# Patient Record
Sex: FEMALE | Race: WHITE | NOT HISPANIC OR LATINO | Employment: FULL TIME | URBAN - METROPOLITAN AREA
[De-identification: names, ages, dates, MRNs, and addresses within clinical notes are randomized per-mention and may not be internally consistent; named-entity substitution may affect disease eponyms.]

---

## 2017-07-21 ENCOUNTER — OFFICE VISIT (OUTPATIENT)
Dept: FAMILY MEDICINE CLINIC | Facility: CLINIC | Age: 52
End: 2017-07-21

## 2017-07-21 VITALS
SYSTOLIC BLOOD PRESSURE: 150 MMHG | TEMPERATURE: 97.9 F | HEIGHT: 63 IN | RESPIRATION RATE: 16 BRPM | OXYGEN SATURATION: 98 % | HEART RATE: 102 BPM | DIASTOLIC BLOOD PRESSURE: 100 MMHG

## 2017-07-21 DIAGNOSIS — Z13.220 LIPID SCREENING: ICD-10-CM

## 2017-07-21 DIAGNOSIS — IMO0001 ELEVATED BLOOD PRESSURE: Primary | ICD-10-CM

## 2017-07-21 PROCEDURE — 99213 OFFICE O/P EST LOW 20 MIN: CPT | Performed by: NURSE PRACTITIONER

## 2017-07-21 RX ORDER — CHLORHEXIDINE GLUCONATE 0.12 MG/ML
RINSE ORAL
Refills: 2 | COMMUNITY
Start: 2017-07-14 | End: 2017-10-24

## 2017-07-21 NOTE — PROGRESS NOTES
Subjective   Keke Stewart is a 52 y.o. female.   Chief Complaint   Patient presents with   • Blood Pressure Check     pt states would like to come in to do b/p check      Vitals:    07/21/17 1030   BP: 150/100   Pulse: 102   Resp: 16   Temp: 97.9 °F (36.6 °C)   SpO2: 98%     No LMP recorded. Patient is postmenopausal.    History of Present Illness  Keke is a patient of Dr Martin who is here for a blood pressure check. She went to her gyn yesterday and her blood pressure was elevated and was told to schedule an appt for today for a recheck. She does not check it regularly at home. She reports she has been stressed about certain things which caused some weight gain. She also reports taht she gets nervous when she goes to the doctors office. She denies dizziness, headaches, visual disturbance or light headedness. Over the past few weeks she has been eating better and exercising     The following portions of the patient's history were reviewed and updated as appropriate: allergies, current medications, past family history, past medical history, past social history, past surgical history and problem list.    Review of Systems   Constitutional: Negative for chills, fatigue and fever.   HENT: Negative.    Eyes: Negative for visual disturbance.   Respiratory: Negative for cough, chest tightness, shortness of breath and wheezing.    Cardiovascular: Negative for chest pain, palpitations and leg swelling.   Genitourinary: Negative.    Musculoskeletal: Negative for arthralgias.   Neurological: Negative for dizziness, numbness and headaches.       Objective   Physical Exam   Constitutional: Vital signs are normal. She appears well-developed and well-nourished. No distress.   Cardiovascular: Normal rate, regular rhythm, S1 normal and normal heart sounds.    Pulmonary/Chest: Effort normal and breath sounds normal.   Neurological: She is alert.   Skin: Skin is warm and dry.       Assessment/Plan   Keke was seen today  for blood pressure check.    Diagnoses and all orders for this visit:    Elevated blood pressure  -     Lipid Panel With LDL / HDL Ratio  -     Comprehensive Metabolic Panel  -     TSH Rfx On Abnormal To Free T4  -     Urinalysis With Microscopic    Lipid screening  -     Lipid Panel With LDL / HDL Ratio      She is fasting so will check labs today  Advised to monitor her blood pressure at home twice daily and record   Given an education sheet on at home blood pressure monitoring  Follow up in 2 weeks for a recheck and to discuss labs or sooner if needed

## 2017-07-22 LAB
ALBUMIN SERPL-MCNC: 4.8 G/DL (ref 3.5–5.5)
ALBUMIN/GLOB SERPL: 1.8 {RATIO} (ref 1.2–2.2)
ALP SERPL-CCNC: 102 IU/L (ref 39–117)
ALT SERPL-CCNC: 22 IU/L (ref 0–32)
APPEARANCE UR: CLEAR
AST SERPL-CCNC: 23 IU/L (ref 0–40)
BACTERIA #/AREA URNS HPF: NORMAL /[HPF]
BILIRUB SERPL-MCNC: 0.4 MG/DL (ref 0–1.2)
BILIRUB UR QL STRIP: NEGATIVE
BUN SERPL-MCNC: 10 MG/DL (ref 6–24)
BUN/CREAT SERPL: 12 (ref 9–23)
CALCIUM SERPL-MCNC: 9.8 MG/DL (ref 8.7–10.2)
CHLORIDE SERPL-SCNC: 100 MMOL/L (ref 96–106)
CHOLEST SERPL-MCNC: 177 MG/DL (ref 100–199)
CO2 SERPL-SCNC: 25 MMOL/L (ref 18–29)
COLOR UR: YELLOW
CREAT SERPL-MCNC: 0.85 MG/DL (ref 0.57–1)
EPI CELLS #/AREA URNS HPF: NORMAL /HPF
GLOBULIN SER CALC-MCNC: 2.6 G/DL (ref 1.5–4.5)
GLUCOSE SERPL-MCNC: 103 MG/DL (ref 65–99)
GLUCOSE UR QL: NEGATIVE
HDLC SERPL-MCNC: 69 MG/DL
HGB UR QL STRIP: NEGATIVE
KETONES UR QL STRIP: NEGATIVE
LDLC SERPL CALC-MCNC: 91 MG/DL (ref 0–99)
LDLC/HDLC SERPL: 1.3 RATIO UNITS (ref 0–3.2)
LEUKOCYTE ESTERASE UR QL STRIP: (no result)
MICRO URNS: (no result)
MUCOUS THREADS URNS QL MICRO: PRESENT
NITRITE UR QL STRIP: NEGATIVE
PH UR STRIP: 6.5 [PH] (ref 5–7.5)
POTASSIUM SERPL-SCNC: 4 MMOL/L (ref 3.5–5.2)
PROT SERPL-MCNC: 7.4 G/DL (ref 6–8.5)
PROT UR QL STRIP: NEGATIVE
RBC #/AREA URNS HPF: NORMAL /HPF
SODIUM SERPL-SCNC: 143 MMOL/L (ref 134–144)
SP GR UR: 1.01 (ref 1–1.03)
TRIGL SERPL-MCNC: 86 MG/DL (ref 0–149)
TSH SERPL DL<=0.005 MIU/L-ACNC: 1.28 UIU/ML (ref 0.45–4.5)
UROBILINOGEN UR STRIP-MCNC: 0.2 MG/DL (ref 0.2–1)
VLDLC SERPL CALC-MCNC: 17 MG/DL (ref 5–40)
WBC #/AREA URNS HPF: NORMAL /HPF

## 2017-07-24 ENCOUNTER — TELEPHONE (OUTPATIENT)
Dept: FAMILY MEDICINE CLINIC | Facility: CLINIC | Age: 52
End: 2017-07-24

## 2017-07-24 NOTE — TELEPHONE ENCOUNTER
TALKED TO PT AND LET PT KNOW PER RIVERA MELISSA. PT UNDERSTOOD AN WILL GET LAB COPY @ NEXT APPT.   ----- Message from MARTHA Nevarez sent at 7/24/2017  8:25 AM EDT -----  Please call her and let her know that her labs were normal   Will give her a copy at her follow up

## 2017-08-01 LAB
SPECIMEN STATUS: NORMAL
WRITTEN AUTHORIZATION: NORMAL

## 2017-08-02 ENCOUNTER — OFFICE VISIT (OUTPATIENT)
Dept: FAMILY MEDICINE CLINIC | Facility: CLINIC | Age: 52
End: 2017-08-02

## 2017-08-02 VITALS
HEART RATE: 94 BPM | DIASTOLIC BLOOD PRESSURE: 88 MMHG | OXYGEN SATURATION: 99 % | WEIGHT: 184.8 LBS | HEIGHT: 63 IN | RESPIRATION RATE: 16 BRPM | TEMPERATURE: 97.8 F | SYSTOLIC BLOOD PRESSURE: 130 MMHG | BODY MASS INDEX: 32.74 KG/M2

## 2017-08-02 DIAGNOSIS — IMO0001 ELEVATED BLOOD PRESSURE: Primary | ICD-10-CM

## 2017-08-02 PROCEDURE — 99213 OFFICE O/P EST LOW 20 MIN: CPT | Performed by: NURSE PRACTITIONER

## 2017-08-02 NOTE — PROGRESS NOTES
Subjective   Keke Stewart is a 52 y.o. female. Patient is here today for   Chief Complaint   Patient presents with   • Blood Pressure Check   • lab follow up          Vitals:    08/02/17 1057   BP: 130/88   Pulse: 94   Resp: 16   Temp: 97.8 °F (36.6 °C)   SpO2: 99%       The following portions of the patient's history were reviewed and updated as appropriate: allergies, current medications, past family history, past medical history, past social history, past surgical history and problem list.    Past Medical History:   Diagnosis Date   • Headache       Allergies   Allergen Reactions   • Codeine    • Erythromycin    • Penicillins       Social History     Social History   • Marital status:      Spouse name: N/A   • Number of children: N/A   • Years of education: N/A     Occupational History   • Not on file.     Social History Main Topics   • Smoking status: Never Smoker   • Smokeless tobacco: Never Used   • Alcohol use No   • Drug use: Not on file   • Sexual activity: Not on file     Other Topics Concern   • Not on file     Social History Narrative        Current Outpatient Prescriptions:   •  chlorhexidine (PERIDEX) 0.12 % solution, U UTD, Disp: , Rfl: 2  •  montelukast (SINGULAIR) 10 MG tablet, TK 1 T PO HS, Disp: , Rfl: 3     Objective   History of Present Illness  Keke is here for a two week follow up for elevated blood pressure. She has been monitoring her blood pressure at home twice daily. I reviewed her blood pressure log and it has been ranging from 120-140/80-90. She has been eating well and walking daily. She has lost 6 lbs from her last visit. She feels well and has no complaints today. I discussed her labs in detail with her.     Review of Systems   Constitutional: Negative for chills, fatigue and fever.   Respiratory: Negative.    Cardiovascular: Negative.    Genitourinary: Negative.    Musculoskeletal: Negative.    Neurological: Negative for dizziness and headaches.       Physical Exam    Constitutional: Vital signs are normal. She appears well-developed and well-nourished. No distress.   Cardiovascular: Normal rate, regular rhythm and normal heart sounds.    Pulmonary/Chest: Effort normal and breath sounds normal.   Neurological: She is alert.   Skin: Skin is warm and dry.   Psychiatric: She has a normal mood and affect.     Office Visit on 07/21/2017   Component Date Value Ref Range Status   • Total Cholesterol 07/21/2017 177  100 - 199 mg/dL Final   • Triglycerides 07/21/2017 86  0 - 149 mg/dL Final   • HDL Cholesterol 07/21/2017 69  >39 mg/dL Final   • VLDL Cholesterol 07/21/2017 17  5 - 40 mg/dL Final   • LDL Cholesterol  07/21/2017 91  0 - 99 mg/dL Final   • LDL/HDL Ratio 07/21/2017 1.3  0.0 - 3.2 ratio units Final    Comment:                                     LDL/HDL Ratio                                              Men  Women                                1/2 Avg.Risk  1.0    1.5                                    Avg.Risk  3.6    3.2                                 2X Avg.Risk  6.2    5.0                                 3X Avg.Risk  8.0    6.1     • Glucose 07/21/2017 103* 65 - 99 mg/dL Final   • BUN 07/21/2017 10  6 - 24 mg/dL Final   • Creatinine 07/21/2017 0.85  0.57 - 1.00 mg/dL Final   • eGFR Non African Am 07/21/2017 78  >59 mL/min/1.73 Final   • eGFR African Am 07/21/2017 90  >59 mL/min/1.73 Final   • BUN/Creatinine Ratio 07/21/2017 12  9 - 23 Final   • Sodium 07/21/2017 143  134 - 144 mmol/L Final   • Potassium 07/21/2017 4.0  3.5 - 5.2 mmol/L Final   • Chloride 07/21/2017 100  96 - 106 mmol/L Final   • Total CO2 07/21/2017 25  18 - 29 mmol/L Final   • Calcium 07/21/2017 9.8  8.7 - 10.2 mg/dL Final   • Total Protein 07/21/2017 7.4  6.0 - 8.5 g/dL Final   • Albumin 07/21/2017 4.8  3.5 - 5.5 g/dL Final   • Globulin 07/21/2017 2.6  1.5 - 4.5 g/dL Final   • A/G Ratio 07/21/2017 1.8  1.2 - 2.2 Final   • Total Bilirubin 07/21/2017 0.4  0.0 - 1.2 mg/dL Final   • Alkaline Phosphatase  07/21/2017 102  39 - 117 IU/L Final   • AST (SGOT) 07/21/2017 23  0 - 40 IU/L Final   • ALT (SGPT) 07/21/2017 22  0 - 32 IU/L Final   • TSH 07/21/2017 1.280  0.450 - 4.500 uIU/mL Final   • Specific Gravity, UA 07/21/2017 1.010  1.005 - 1.030 Final   • pH, UA 07/21/2017 6.5  5.0 - 7.5 Final   • Color, UA 07/21/2017 Yellow  Yellow Final   • Appearance, UA 07/21/2017 Clear  Clear Final   • Leukocytes, UA 07/21/2017 Trace* Negative Final   • Protein 07/21/2017 Negative  Negative/Trace Final   • Glucose, UA 07/21/2017 Negative  Negative Final   • Ketones 07/21/2017 Negative  Negative Final   • Blood, UA 07/21/2017 Negative  Negative Final   • Bilirubin, UA 07/21/2017 Negative  Negative Final   • Urobilinogen, UA 07/21/2017 0.2  0.2 - 1.0 mg/dL Final   • Nitrite, UA 07/21/2017 Negative  Negative Final   • Microscopic Examination 07/21/2017 See below:   Final    Microscopic was indicated and was performed.   • WBC, UA 07/21/2017 0-5  0 - 5 /hpf Final   • RBC, UA 07/21/2017 None seen  0 - 2 /hpf Final   • Epithelial Cells (non renal) 07/21/2017 None seen  0 - 10 /hpf Final   • Mucus, UA 07/21/2017 Present  Not Estab. Final   • Bacteria, UA 07/21/2017 None seen  None seen/Few Final   • Specimen Status 07/21/2017 Comment   Final    Comment: We have received your request for additional testing, however we are  unable to add the test you requested.  The following test(s) were not  performed:  Test not performed and specimen no longer remains for testing.  Test:  [id number] Hepatitis C Antibodies, with reflex to NEVAEH     • Written Authorization 07/21/2017 Comment   Final    Comment: Written Authorization Received.  Authorization received from Riya Marinelli  per manual add form 08-  Logged by Heather Boone         ASSESSMENT  Problem List Items Addressed This Visit     None      Visit Diagnoses     Elevated blood pressure    -  Primary          PLAN    Continue with diet, exercise and weight loss  Blood pressure is  stable  Call if blood pressure >140/90  Follow up yearly for physical

## 2017-08-04 ENCOUNTER — APPOINTMENT (OUTPATIENT)
Dept: WOMENS IMAGING | Facility: HOSPITAL | Age: 52
End: 2017-08-04

## 2017-08-04 PROCEDURE — 77063 BREAST TOMOSYNTHESIS BI: CPT | Performed by: RADIOLOGY

## 2017-08-04 PROCEDURE — 77067 SCR MAMMO BI INCL CAD: CPT | Performed by: RADIOLOGY

## 2017-08-04 PROCEDURE — 77065 DX MAMMO INCL CAD UNI: CPT | Performed by: RADIOLOGY

## 2017-08-04 PROCEDURE — MDREVSPNEW: Performed by: RADIOLOGY

## 2017-08-04 PROCEDURE — G0206 DX MAMMO INCL CAD UNI: HCPCS | Performed by: RADIOLOGY

## 2017-08-04 PROCEDURE — 76641 ULTRASOUND BREAST COMPLETE: CPT | Performed by: RADIOLOGY

## 2017-08-04 PROCEDURE — G0202 SCR MAMMO BI INCL CAD: HCPCS | Performed by: RADIOLOGY

## 2017-08-04 PROCEDURE — 77061 BREAST TOMOSYNTHESIS UNI: CPT | Performed by: RADIOLOGY

## 2017-08-04 PROCEDURE — G0279 TOMOSYNTHESIS, MAMMO: HCPCS | Performed by: RADIOLOGY

## 2017-10-24 ENCOUNTER — OFFICE VISIT (OUTPATIENT)
Dept: FAMILY MEDICINE CLINIC | Facility: CLINIC | Age: 52
End: 2017-10-24

## 2017-10-24 VITALS
HEIGHT: 63 IN | TEMPERATURE: 98.2 F | BODY MASS INDEX: 33.35 KG/M2 | HEART RATE: 93 BPM | DIASTOLIC BLOOD PRESSURE: 84 MMHG | RESPIRATION RATE: 16 BRPM | SYSTOLIC BLOOD PRESSURE: 124 MMHG | WEIGHT: 188.2 LBS | OXYGEN SATURATION: 99 %

## 2017-10-24 DIAGNOSIS — G47.00 INSOMNIA, UNSPECIFIED TYPE: Primary | ICD-10-CM

## 2017-10-24 DIAGNOSIS — J30.9 CHRONIC ALLERGIC RHINITIS, UNSPECIFIED SEASONALITY, UNSPECIFIED TRIGGER: ICD-10-CM

## 2017-10-24 PROCEDURE — 99213 OFFICE O/P EST LOW 20 MIN: CPT | Performed by: NURSE PRACTITIONER

## 2017-10-24 RX ORDER — TRAZODONE HYDROCHLORIDE 50 MG/1
50 TABLET ORAL NIGHTLY
Qty: 30 TABLET | Refills: 3 | Status: SHIPPED | OUTPATIENT
Start: 2017-10-24 | End: 2018-12-11

## 2017-10-24 RX ORDER — MONTELUKAST SODIUM 10 MG/1
10 TABLET ORAL NIGHTLY
Qty: 90 TABLET | Refills: 3 | Status: SHIPPED | OUTPATIENT
Start: 2017-10-24 | End: 2019-01-20 | Stop reason: SDUPTHER

## 2017-10-24 NOTE — PROGRESS NOTES
Subjective   Keke Stewart is a 52 y.o. female.   Chief Complaint   Patient presents with   • Insomnia     pt states cannot get sleep and had previously , pt states takes otc meds and doesn't work     Vitals:    10/24/17 1452   BP: 124/84   Pulse: 93   Resp: 16   Temp: 98.2 °F (36.8 °C)   SpO2: 99%     No LMP recorded. Patient is postmenopausal.    History of Present Illness  Keke is here for to discuss insomnia. She has had in on and off for awhile. She has tried advil PM and melatonin with little relief. She has trouble falling asleep and staying asleep. It often takes her over an hour to fall asleep and if she wakes up in the middle of the night it takes her 2 hours to fall asleep. She reports that her insomnia has worsened since her daughter started college . She denies any symptoms of CASSY such as witnessed apnea, snoring, or daytime hypersomnia   She also needs a refill for her singulair     The following portions of the patient's history were reviewed and updated as appropriate: allergies, current medications, past family history, past medical history, past social history, past surgical history and problem list.    Review of Systems   Constitutional: Negative for chills, fatigue and fever.   Respiratory: Negative for cough, chest tightness and wheezing.    Cardiovascular: Negative.    Allergic/Immunologic: Positive for environmental allergies.   Psychiatric/Behavioral: Positive for sleep disturbance.       Objective   Physical Exam   Constitutional: Vital signs are normal. She appears well-developed and well-nourished. No distress.   Cardiovascular: Normal rate.    Pulmonary/Chest: Effort normal and breath sounds normal.   Neurological: She is alert.   Skin: Skin is warm and dry.       Assessment/Plan   Keke was seen today for insomnia.    Diagnoses and all orders for this visit:    Insomnia, unspecified type    Chronic allergic rhinitis, unspecified seasonality, unspecified trigger    Other  orders  -     traZODone (DESYREL) 50 MG tablet; Take 1 tablet by mouth Every Night.  -     montelukast (SINGULAIR) 10 MG tablet; Take 1 tablet by mouth Every Night. TK 1 T PO HS      Start trazodone 50 1/2 tablet then increase to one tab if needed   Suggest sleep hygiene  Follow up if symptoms persist, worsen, or if new symptoms develop

## 2018-05-02 ENCOUNTER — OFFICE VISIT (OUTPATIENT)
Dept: FAMILY MEDICINE CLINIC | Facility: CLINIC | Age: 53
End: 2018-05-02

## 2018-05-02 VITALS
TEMPERATURE: 97.7 F | BODY MASS INDEX: 32.6 KG/M2 | RESPIRATION RATE: 17 BRPM | SYSTOLIC BLOOD PRESSURE: 140 MMHG | OXYGEN SATURATION: 99 % | DIASTOLIC BLOOD PRESSURE: 90 MMHG | WEIGHT: 184 LBS | HEIGHT: 63 IN | HEART RATE: 91 BPM

## 2018-05-02 DIAGNOSIS — J01.00 ACUTE MAXILLARY SINUSITIS, RECURRENCE NOT SPECIFIED: Primary | ICD-10-CM

## 2018-05-02 PROCEDURE — 99213 OFFICE O/P EST LOW 20 MIN: CPT | Performed by: NURSE PRACTITIONER

## 2018-05-02 RX ORDER — CEFUROXIME AXETIL 250 MG/1
250 TABLET ORAL 2 TIMES DAILY
Qty: 14 TABLET | Refills: 0 | Status: SHIPPED | OUTPATIENT
Start: 2018-05-02 | End: 2018-06-22

## 2018-05-02 NOTE — PROGRESS NOTES
Subjective   Keke Stewart is a 53 y.o. female.   Chief Complaint   Patient presents with   • Sinus Problem     x 9 days   • Dizziness     x 9 days     Vitals:    05/02/18 1433   BP: 140/90   Pulse: 91   Resp: 17   Temp: 97.7 °F (36.5 °C)   SpO2: 99%     No LMP recorded. Patient is postmenopausal.    History of Present Illness Sinus Pain  Patient complains of congestion, facial pain, nasal congestion and post nasal drip. Onset of symptoms was 1 week ago. Symptoms have been unchanged since that time. She is drinking plenty of fluids.  Past history is significant for allergic rhinitis and has been taking singulair . Patient is non-smoker.     The following portions of the patient's history were reviewed and updated as appropriate: allergies, current medications, past family history, past medical history, past social history, past surgical history and problem list.    Review of Systems   Constitutional: Negative for chills, fatigue and fever.   HENT: Positive for congestion, sinus pain and sinus pressure.    Eyes: Negative for visual disturbance.   Respiratory: Negative for cough, shortness of breath and wheezing.    Neurological: Positive for dizziness (has improved, ).       Objective   Physical Exam   Constitutional: Vital signs are normal. She appears well-developed and well-nourished. She does not appear ill. No distress.   HENT:   Right Ear: Ear canal normal. A middle ear effusion is present.   Left Ear: Ear canal normal. A middle ear effusion is present.   Nose: Mucosal edema and rhinorrhea (purulent ) present. Right sinus exhibits maxillary sinus tenderness. Left sinus exhibits maxillary sinus tenderness.   Mouth/Throat: Uvula is midline. Posterior oropharyngeal erythema (post nasal drainage noted in the posterior pharynx ) present.   Cardiovascular: Normal rate and regular rhythm.    Pulmonary/Chest: Effort normal and breath sounds normal.   Neurological: She is alert.       Assessment/Plan   Keke segura  seen today for sinus problem and dizziness.    Diagnoses and all orders for this visit:    Acute maxillary sinusitis, recurrence not specified    Other orders  -     cefuroxime (CEFTIN) 250 MG tablet; Take 1 tablet by mouth 2 (Two) Times a Day.      Start flonase  Continue singulair   Start claritin  She has a PCN allergy but has tolerated cephalosporins, I gave her warnings of possible cross reaction with PCN allergy  Sinus rinses with saline  She has a plantars wart that has been bothering her on her foot, a list of podiatrists were given to her to call for further eval  Follow up as needed for persistent or worsening symptoms and for continual care

## 2018-06-22 ENCOUNTER — OFFICE VISIT (OUTPATIENT)
Dept: FAMILY MEDICINE CLINIC | Facility: CLINIC | Age: 53
End: 2018-06-22

## 2018-06-22 VITALS
RESPIRATION RATE: 18 BRPM | BODY MASS INDEX: 33.31 KG/M2 | SYSTOLIC BLOOD PRESSURE: 142 MMHG | HEART RATE: 84 BPM | WEIGHT: 188 LBS | DIASTOLIC BLOOD PRESSURE: 90 MMHG | TEMPERATURE: 98 F | HEIGHT: 63 IN

## 2018-06-22 DIAGNOSIS — R35.0 URINARY FREQUENCY: Primary | ICD-10-CM

## 2018-06-22 LAB
BILIRUB BLD-MCNC: NEGATIVE MG/DL
CLARITY, POC: CLEAR
COLOR UR: YELLOW
GLUCOSE UR STRIP-MCNC: NEGATIVE MG/DL
KETONES UR QL: NEGATIVE
LEUKOCYTE EST, POC: NEGATIVE
NITRITE UR-MCNC: NEGATIVE MG/ML
PH UR: 6 [PH] (ref 5–8)
PROT UR STRIP-MCNC: NEGATIVE MG/DL
RBC # UR STRIP: NEGATIVE /UL
SP GR UR: 1.02 (ref 1–1.03)
UROBILINOGEN UR QL: NORMAL

## 2018-06-22 PROCEDURE — 81003 URINALYSIS AUTO W/O SCOPE: CPT | Performed by: INTERNAL MEDICINE

## 2018-06-22 PROCEDURE — 99213 OFFICE O/P EST LOW 20 MIN: CPT | Performed by: INTERNAL MEDICINE

## 2018-06-22 NOTE — PROGRESS NOTES
Subjective   Keke Stewart is a 53 y.o. female. Patient is here today for   Chief Complaint   Patient presents with   • Urinary Frequency     and pressure           Vitals:    06/22/18 1354   BP: 142/90   Pulse: 84   Resp: 18   Temp: 98 °F (36.7 °C)     The following portions of the patient's history were reviewed and updated as appropriate: allergies, current medications, past family history, past medical history, past social history, past surgical history and problem list.    Past Medical History:   Diagnosis Date   • Headache       Allergies   Allergen Reactions   • Codeine    • Erythromycin    • Penicillins       Social History     Social History   • Marital status:      Spouse name: N/A   • Number of children: N/A   • Years of education: N/A     Occupational History   • Not on file.     Social History Main Topics   • Smoking status: Never Smoker   • Smokeless tobacco: Never Used   • Alcohol use No   • Drug use: Unknown   • Sexual activity: Not on file     Other Topics Concern   • Not on file     Social History Narrative   • No narrative on file        Current Outpatient Prescriptions:   •  montelukast (SINGULAIR) 10 MG tablet, Take 1 tablet by mouth Every Night. TK 1 T PO HS, Disp: 90 tablet, Rfl: 3  •  traZODone (DESYREL) 50 MG tablet, Take 1 tablet by mouth Every Night., Disp: 30 tablet, Rfl: 3     Objective     History of Present Illness Keke complains of urinary frequency and a feeling of urinary pressure that started a few days ago.  She does drink a lot of tea and occasional soft drink.  She denies burning urgency or hematuria.  She has her annual gynecological examination scheduled in 1 month.    Review of Systems   Constitutional: Negative.    Genitourinary: Positive for frequency. Negative for dysuria, flank pain, hematuria and urgency.       Physical Exam   Constitutional: She appears well-developed and well-nourished.   Abdominal:   There is some suprapubic discomfort.   Psychiatric:  She has a normal mood and affect. Her behavior is normal. Judgment and thought content normal.   Vitals reviewed.      ASSESSMENT     Problem List Items Addressed This Visit        Genitourinary    Urinary frequency - Primary    Relevant Orders    POC Urinalysis Dipstick, Automated (Completed)          PLAN  Patient Instructions   Urinalysis today is normal.  Discussed decreasing caffeine intake.  Can try Detrol LA sample.      Return if symptoms worsen or fail to improve.

## 2018-07-27 ENCOUNTER — RESULTS ENCOUNTER (OUTPATIENT)
Dept: FAMILY MEDICINE CLINIC | Facility: CLINIC | Age: 53
End: 2018-07-27

## 2018-07-27 DIAGNOSIS — Z12.11 SCREENING FOR COLON CANCER: Primary | ICD-10-CM

## 2018-07-27 DIAGNOSIS — Z12.11 SCREENING FOR COLON CANCER: ICD-10-CM

## 2018-08-07 ENCOUNTER — APPOINTMENT (OUTPATIENT)
Dept: WOMENS IMAGING | Facility: HOSPITAL | Age: 53
End: 2018-08-07

## 2018-08-07 PROCEDURE — 77067 SCR MAMMO BI INCL CAD: CPT | Performed by: RADIOLOGY

## 2018-08-07 PROCEDURE — 77063 BREAST TOMOSYNTHESIS BI: CPT | Performed by: RADIOLOGY

## 2018-08-07 PROCEDURE — MDREVIEWSP: Performed by: RADIOLOGY

## 2018-08-10 ENCOUNTER — HOSPITAL ENCOUNTER (OUTPATIENT)
Dept: GENERAL RADIOLOGY | Facility: HOSPITAL | Age: 53
Discharge: HOME | End: 2018-08-10

## 2018-08-10 ENCOUNTER — OFFICE VISIT (OUTPATIENT)
Dept: FAMILY MEDICINE CLINIC | Facility: CLINIC | Age: 53
End: 2018-08-10

## 2018-08-10 ENCOUNTER — TELEPHONE (OUTPATIENT)
Dept: FAMILY MEDICINE CLINIC | Facility: CLINIC | Age: 53
End: 2018-08-10

## 2018-08-10 ENCOUNTER — HOSPITAL ENCOUNTER (OUTPATIENT)
Dept: GENERAL RADIOLOGY | Facility: HOSPITAL | Age: 53
Discharge: HOME | End: 2018-08-10
Admitting: NURSE PRACTITIONER

## 2018-08-10 VITALS
HEIGHT: 63 IN | BODY MASS INDEX: 33.13 KG/M2 | WEIGHT: 187 LBS | DIASTOLIC BLOOD PRESSURE: 84 MMHG | SYSTOLIC BLOOD PRESSURE: 124 MMHG | HEART RATE: 94 BPM | OXYGEN SATURATION: 98 % | TEMPERATURE: 97.4 F | RESPIRATION RATE: 16 BRPM

## 2018-08-10 DIAGNOSIS — M54.6 ACUTE BILATERAL THORACIC BACK PAIN: Primary | ICD-10-CM

## 2018-08-10 DIAGNOSIS — R07.81 RIB PAIN: ICD-10-CM

## 2018-08-10 DIAGNOSIS — M54.6 ACUTE BILATERAL THORACIC BACK PAIN: ICD-10-CM

## 2018-08-10 PROCEDURE — 99214 OFFICE O/P EST MOD 30 MIN: CPT | Performed by: NURSE PRACTITIONER

## 2018-08-10 PROCEDURE — 71111 X-RAY EXAM RIBS/CHEST4/> VWS: CPT

## 2018-08-10 PROCEDURE — 72072 X-RAY EXAM THORAC SPINE 3VWS: CPT

## 2018-08-10 RX ORDER — TOLTERODINE TARTRATE 2 MG/1
TABLET, EXTENDED RELEASE ORAL
Refills: 5 | COMMUNITY
Start: 2018-07-16 | End: 2020-07-22

## 2018-08-10 RX ORDER — CHLORHEXIDINE GLUCONATE 0.12 MG/ML
RINSE ORAL SEE ADMIN INSTRUCTIONS
Refills: 1 | COMMUNITY
Start: 2018-07-15

## 2018-08-10 NOTE — PROGRESS NOTES
Subjective   Keke Stewart is a 53 y.o. female.   Chief Complaint   Patient presents with   • Rib Pain     off and on x 3 weeks     Vitals:    08/10/18 0854   BP: 124/84   Pulse: 94   Resp: 16   Temp: 97.4 °F (36.3 °C)   SpO2: 98%     No LMP recorded. Patient is postmenopausal.    History of Present Illness  Keke is here for an acute visit. She c/o bilateral rib pain and thoracic back pain for 3 weeks. It is mild, does not radiate, and occurs mostly with movement. It comes and goes. She has not taken anything over the counter. She denies any known injury. She has been walking for exercise but has not done any lifting. She had a recent mammogram a few days ago but I do not have the results     The following portions of the patient's history were reviewed and updated as appropriate: allergies, current medications, past family history, past medical history, past social history, past surgical history and problem list.    Review of Systems   Constitutional: Negative for chills, fatigue and fever.   HENT: Negative.    Respiratory: Negative for cough, chest tightness, shortness of breath and wheezing.    Cardiovascular: Negative for chest pain, palpitations and leg swelling.   Musculoskeletal: Positive for arthralgias and back pain (thoracic back pain, bilateral rib pain ).       Objective   Physical Exam   Constitutional: Vital signs are normal. She appears well-developed and well-nourished. She does not appear ill. No distress.   Cardiovascular: Normal rate.    Pulmonary/Chest: Effort normal. She exhibits tenderness (mild scattered tenderness on the ribs bilaterally ). She exhibits no mass, no laceration, no crepitus, no edema, no deformity and no swelling.   Musculoskeletal:        Thoracic back: She exhibits normal range of motion, no tenderness, no bony tenderness, no edema, no deformity and no laceration.   Neurological: She is alert.   Skin: Skin is warm and dry. No abrasion and no bruising noted.    Psychiatric:   Slightly anxious about the pain she is experiencing        Assessment/Plan   Keke was seen today for rib pain.    Diagnoses and all orders for this visit:    Acute bilateral thoracic back pain  -     XR spine thoracic 3 vw; Future    Rib pain  -     XR ribs bilateral 4+ vw w pa chest; Future      Recommend motrin or tylenol as needed  Apply heat as needed  Will call with xray results  Schedule follow up with labs or annual physical  in the next month or so  Follow up If needed for persistent or worsening symptoms

## 2018-10-12 ENCOUNTER — OFFICE VISIT (OUTPATIENT)
Dept: FAMILY MEDICINE CLINIC | Facility: CLINIC | Age: 53
End: 2018-10-12

## 2018-10-12 VITALS
OXYGEN SATURATION: 99 % | HEIGHT: 63 IN | DIASTOLIC BLOOD PRESSURE: 78 MMHG | WEIGHT: 187 LBS | TEMPERATURE: 97.6 F | RESPIRATION RATE: 17 BRPM | HEART RATE: 87 BPM | BODY MASS INDEX: 33.13 KG/M2 | SYSTOLIC BLOOD PRESSURE: 120 MMHG

## 2018-10-12 DIAGNOSIS — M79.672 LEFT FOOT PAIN: ICD-10-CM

## 2018-10-12 DIAGNOSIS — Z23 NEED FOR INFLUENZA VACCINATION: Primary | ICD-10-CM

## 2018-10-12 PROCEDURE — 90471 IMMUNIZATION ADMIN: CPT | Performed by: NURSE PRACTITIONER

## 2018-10-12 PROCEDURE — 99213 OFFICE O/P EST LOW 20 MIN: CPT | Performed by: NURSE PRACTITIONER

## 2018-10-12 PROCEDURE — 90674 CCIIV4 VAC NO PRSV 0.5 ML IM: CPT | Performed by: NURSE PRACTITIONER

## 2018-10-12 NOTE — PROGRESS NOTES
Subjective   Keke Stewart is a 53 y.o. female.   Chief Complaint   Patient presents with   • Foot Injury     left foot fell in driveway 2 days ago     Vitals:    10/12/18 1534   BP: 120/78   Pulse: 87   Resp: 17   Temp: 97.6 °F (36.4 °C)   SpO2: 99%     No LMP recorded. Patient is postmenopausal.    Keke is here for an acute visit. This is a new problem       Foot Injury    The incident occurred 2 days ago. The incident occurred at home. The injury mechanism was a fall. The pain is present in the left foot. The pain is at a severity of 8/10 (when walking only ). The patient is experiencing no pain (no pain at present ). The pain has been intermittent since onset. Pertinent negatives include no inability to bear weight, loss of motion, loss of sensation, muscle weakness, numbness or tingling. She reports no foreign bodies present. The symptoms are aggravated by movement. She has tried NSAIDs for the symptoms. The treatment provided mild relief.        The following portions of the patient's history were reviewed and updated as appropriate: allergies, current medications, past family history, past medical history, past social history, past surgical history and problem list.    Review of Systems   Constitutional: Negative for chills.   Respiratory: Negative.    Cardiovascular: Negative.    Musculoskeletal:        Left foot pain    Neurological: Negative for tingling and numbness.       Objective   Physical Exam   Constitutional: Vital signs are normal. She appears well-developed and well-nourished. She does not appear ill. No distress.   Cardiovascular: Normal rate and regular rhythm.    Pulmonary/Chest: Effort normal and breath sounds normal.   Musculoskeletal:        Left foot: There is tenderness. There is normal range of motion and no swelling.        Neurological: She is alert.   Skin: Skin is warm and dry.       Assessment/Plan   Keke was seen today for foot injury.    Diagnoses and all orders for  this visit:    Need for influenza vaccination  -     Flucelvax Quad (Vial) =>4 yrs ([id number])    Left foot pain      Rest, ice, and elevate  Continue with ibuprofen as needed  Discussed xray but declined, will call if no better  Follow up if symptoms persist, worsen, or if new symptoms develop   Flu vaccine today

## 2018-10-15 ENCOUNTER — TELEPHONE (OUTPATIENT)
Dept: FAMILY MEDICINE CLINIC | Facility: CLINIC | Age: 53
End: 2018-10-15

## 2018-12-11 ENCOUNTER — OFFICE VISIT (OUTPATIENT)
Dept: FAMILY MEDICINE CLINIC | Facility: CLINIC | Age: 53
End: 2018-12-11

## 2018-12-11 VITALS
HEART RATE: 89 BPM | RESPIRATION RATE: 18 BRPM | SYSTOLIC BLOOD PRESSURE: 126 MMHG | WEIGHT: 191.8 LBS | BODY MASS INDEX: 33.98 KG/M2 | HEIGHT: 63 IN | TEMPERATURE: 98.3 F | OXYGEN SATURATION: 99 % | DIASTOLIC BLOOD PRESSURE: 78 MMHG

## 2018-12-11 DIAGNOSIS — J01.00 ACUTE NON-RECURRENT MAXILLARY SINUSITIS: Primary | ICD-10-CM

## 2018-12-11 PROCEDURE — 99213 OFFICE O/P EST LOW 20 MIN: CPT | Performed by: NURSE PRACTITIONER

## 2018-12-11 RX ORDER — CEFUROXIME AXETIL 250 MG/1
250 TABLET ORAL 2 TIMES DAILY
Qty: 20 TABLET | Refills: 0 | Status: SHIPPED | OUTPATIENT
Start: 2018-12-11 | End: 2018-12-24

## 2018-12-11 NOTE — PROGRESS NOTES
Subjective   Keke Stewart is a 53 y.o. female.   Chief Complaint   Patient presents with   • Ear Fullness     left ear    • sinus pressure     has been going on for about 2 weeks      Vitals:    12/11/18 1542   BP: 126/78   Pulse: 89   Resp: 18   Temp: 98.3 °F (36.8 °C)   SpO2: 99%     No LMP recorded. Patient is postmenopausal.    Keke is here for an acute visit.       Sinus Problem   This is a new problem. Episode onset: 2 weeks. The problem is unchanged. There has been no fever. Her pain is at a severity of 0/10. Associated symptoms include congestion, ear pain and sinus pressure. Pertinent negatives include no chills. Past treatments include oral decongestants. The treatment provided no relief.        The following portions of the patient's history were reviewed and updated as appropriate: allergies, current medications, past family history, past medical history, past social history, past surgical history and problem list.    Review of Systems   Constitutional: Negative for chills and fatigue.   HENT: Positive for congestion, ear pain, postnasal drip, rhinorrhea, sinus pressure and sinus pain.        Objective   Physical Exam   Constitutional: Vital signs are normal. She appears well-developed and well-nourished. She does not appear ill. No distress.   HENT:   Right Ear: Tympanic membrane normal.   Left Ear: Tympanic membrane is not erythematous. A middle ear effusion is present.   Nose: Mucosal edema and rhinorrhea present. Right sinus exhibits no maxillary sinus tenderness and no frontal sinus tenderness. Left sinus exhibits maxillary sinus tenderness.   Mouth/Throat: Uvula is midline. Posterior oropharyngeal erythema present.   Cardiovascular: Normal rate and regular rhythm.   Pulmonary/Chest: Effort normal and breath sounds normal.   Neurological: She is alert.       Assessment/Plan   Keke was seen today for ear fullness and sinus pressure.    Diagnoses and all orders for this visit:    Acute  non-recurrent maxillary sinusitis    Other orders  -     cefuroxime (CEFTIN) 250 MG tablet; Take 1 tablet by mouth 2 (Two) Times a Day.      She has a PCN allergy but has tolerated ceftin in the past  Rest and fluids  flonase or nasacort  Follow up if symptoms persist, worsen or if new symptoms develop

## 2018-12-24 ENCOUNTER — OFFICE VISIT (OUTPATIENT)
Dept: FAMILY MEDICINE CLINIC | Facility: CLINIC | Age: 53
End: 2018-12-24

## 2018-12-24 VITALS
DIASTOLIC BLOOD PRESSURE: 80 MMHG | HEART RATE: 82 BPM | TEMPERATURE: 97.7 F | WEIGHT: 195.2 LBS | OXYGEN SATURATION: 99 % | HEIGHT: 63 IN | RESPIRATION RATE: 16 BRPM | BODY MASS INDEX: 34.59 KG/M2 | SYSTOLIC BLOOD PRESSURE: 134 MMHG

## 2018-12-24 DIAGNOSIS — J01.00 ACUTE MAXILLARY SINUSITIS, RECURRENCE NOT SPECIFIED: Primary | ICD-10-CM

## 2018-12-24 PROBLEM — R35.0 URINARY FREQUENCY: Status: RESOLVED | Noted: 2018-06-22 | Resolved: 2018-12-24

## 2018-12-24 PROCEDURE — 99213 OFFICE O/P EST LOW 20 MIN: CPT | Performed by: NURSE PRACTITIONER

## 2018-12-24 RX ORDER — LEVOFLOXACIN 500 MG/1
500 TABLET, FILM COATED ORAL DAILY
Qty: 10 TABLET | Refills: 0 | Status: SHIPPED | OUTPATIENT
Start: 2018-12-24 | End: 2019-04-03

## 2019-01-21 RX ORDER — MONTELUKAST SODIUM 10 MG/1
TABLET ORAL
Qty: 90 TABLET | Refills: 0 | Status: SHIPPED | OUTPATIENT
Start: 2019-01-21 | End: 2019-04-17 | Stop reason: SDUPTHER

## 2019-04-03 ENCOUNTER — OFFICE VISIT (OUTPATIENT)
Dept: FAMILY MEDICINE CLINIC | Facility: CLINIC | Age: 54
End: 2019-04-03

## 2019-04-03 VITALS
HEART RATE: 98 BPM | SYSTOLIC BLOOD PRESSURE: 124 MMHG | TEMPERATURE: 98.1 F | OXYGEN SATURATION: 99 % | RESPIRATION RATE: 18 BRPM | BODY MASS INDEX: 32.67 KG/M2 | HEIGHT: 63 IN | WEIGHT: 184.4 LBS | DIASTOLIC BLOOD PRESSURE: 82 MMHG

## 2019-04-03 DIAGNOSIS — B37.2 SKIN YEAST INFECTION: Primary | ICD-10-CM

## 2019-04-03 PROCEDURE — 99213 OFFICE O/P EST LOW 20 MIN: CPT | Performed by: NURSE PRACTITIONER

## 2019-04-03 RX ORDER — NYSTATIN 100000 U/G
CREAM TOPICAL 2 TIMES DAILY
Qty: 30 G | Refills: 1 | Status: SHIPPED | OUTPATIENT
Start: 2019-04-03 | End: 2020-01-13

## 2019-04-03 NOTE — PROGRESS NOTES
Subjective   Keke Stewart is a 53 y.o. female.   Chief Complaint   Patient presents with   • Rash     rash on right breast started 4/1/19     Vitals:    04/03/19 0948   BP: 124/82   Pulse: 98   Resp: 18   Temp: 98.1 °F (36.7 °C)   SpO2: 99%     No LMP recorded (lmp unknown). Patient is postmenopausal.    History of Present Illness  Keke is here for an acute visit. She c/o rash to her right breast and in between he breasts. She denies pain but c/o pruritis. She denies fever, chills or body aches.     The following portions of the patient's history were reviewed and updated as appropriate: allergies, current medications, past family history, past medical history, past social history, past surgical history and problem list.    Review of Systems   Constitutional: Negative for chills, fatigue and fever.   Respiratory: Negative.    Cardiovascular: Negative.    Skin: Positive for rash.       Objective   Physical Exam   Constitutional: Vital signs are normal. She appears well-developed and well-nourished.   Cardiovascular: Normal rate.   Pulmonary/Chest: Effort normal.   Neurological: She is alert.   Skin: Skin is warm and dry.   There is a erythematous rash underneath her breasts  There is a slightly erythematous patch on her right breast , it is not tender, blanches, and there is no warmth, or induration    Psychiatric: Her mood appears anxious.       Assessment/Plan   Keke was seen today for rash.    Diagnoses and all orders for this visit:    Skin yeast infection    Other orders  -     nystatin (MYCOSTATIN) 358055 UNIT/GM cream; Apply  topically to the appropriate area as directed 2 (Two) Times a Day.      Keep the area clean and dry  Follow up if needed   Recommend CPE with labs

## 2019-04-10 ENCOUNTER — OFFICE VISIT (OUTPATIENT)
Dept: FAMILY MEDICINE CLINIC | Facility: CLINIC | Age: 54
End: 2019-04-10

## 2019-04-10 VITALS
SYSTOLIC BLOOD PRESSURE: 122 MMHG | TEMPERATURE: 98.2 F | DIASTOLIC BLOOD PRESSURE: 80 MMHG | OXYGEN SATURATION: 97 % | BODY MASS INDEX: 33.27 KG/M2 | RESPIRATION RATE: 18 BRPM | WEIGHT: 187.8 LBS | HEART RATE: 94 BPM | HEIGHT: 63 IN

## 2019-04-10 DIAGNOSIS — R07.89 ATYPICAL CHEST PAIN: Primary | ICD-10-CM

## 2019-04-10 DIAGNOSIS — K21.9 GASTROESOPHAGEAL REFLUX DISEASE, ESOPHAGITIS PRESENCE NOT SPECIFIED: ICD-10-CM

## 2019-04-10 PROCEDURE — 99214 OFFICE O/P EST MOD 30 MIN: CPT | Performed by: NURSE PRACTITIONER

## 2019-04-10 PROCEDURE — 93000 ELECTROCARDIOGRAM COMPLETE: CPT | Performed by: NURSE PRACTITIONER

## 2019-04-10 RX ORDER — RANITIDINE 150 MG/1
150 TABLET ORAL 2 TIMES DAILY
Qty: 60 TABLET | Refills: 3 | Status: SHIPPED | OUTPATIENT
Start: 2019-04-10 | End: 2019-08-06 | Stop reason: SDUPTHER

## 2019-04-10 NOTE — PROGRESS NOTES
Subjective   Keke Stewart is a 53 y.o. female.   Chief Complaint   Patient presents with   • Chest Pain     Vitals:    04/10/19 1431   BP: 122/80   Pulse: 94   Resp: 18   Temp: 98.2 °F (36.8 °C)   SpO2: 97%     No LMP recorded (lmp unknown). Patient is postmenopausal.    Keke is here for an acute visit.       Chest Pain    This is a new problem. The current episode started yesterday (noticed it after exercising,). The problem occurs intermittently. The problem has been waxing and waning. The pain is present in the epigastric region (midsternal , goes up into her throat at times and feels like something is stuck ). The pain is mild. The quality of the pain is described as tightness. Associated symptoms include a cough. Pertinent negatives include no abdominal pain, hemoptysis, irregular heartbeat, leg pain, orthopnea, palpitations, PND or shortness of breath. The pain is aggravated by nothing. She has tried nothing for the symptoms.         The following portions of the patient's history were reviewed and updated as appropriate: allergies, current medications, past family history, past medical history, past social history, past surgical history and problem list.    Review of Systems   HENT: Positive for sore throat. Negative for trouble swallowing.    Respiratory: Positive for cough. Negative for hemoptysis and shortness of breath.    Cardiovascular: Positive for chest pain. Negative for palpitations, orthopnea, leg swelling and PND.   Gastrointestinal: Negative for abdominal pain.   Genitourinary: Negative.    Musculoskeletal: Negative.        ECG 12 Lead  Date/Time: 4/10/2019 3:14 PM  Performed by: Riya Marinelli APRN  Authorized by: Riya Marinelli APRN   Comparison: not compared with previous ECG   Previous ECG: no previous ECG available  Rate: normal  Conduction: conduction normal    Clinical impression: normal ECG          Objective   Physical Exam   Constitutional: Vital signs are normal. She  appears well-developed and well-nourished. She does not appear ill. No distress.   HENT:   Head: Normocephalic.   Mouth/Throat: Uvula is midline, oropharynx is clear and moist and mucous membranes are normal.   Cardiovascular: Normal rate, regular rhythm and normal heart sounds.   Pulmonary/Chest: Effort normal and breath sounds normal.   Neurological: She is alert.   Skin: Skin is warm, dry and intact.   Psychiatric: She has a normal mood and affect.       Assessment/Plan   Keke was seen today for chest pain.    Diagnoses and all orders for this visit:    Atypical chest pain    Gastroesophageal reflux disease, esophagitis presence not specified    Other orders  -     raNITIdine (ZANTAC) 150 MG tablet; Take 1 tablet by mouth 2 (Two) Times a Day.      ekg was normal  Start zantac twice daily. If no improvement may consider an upper GI   Follow up as needed

## 2019-04-17 RX ORDER — MONTELUKAST SODIUM 10 MG/1
TABLET ORAL
Qty: 90 TABLET | Refills: 0 | Status: SHIPPED | OUTPATIENT
Start: 2019-04-17 | End: 2019-07-09 | Stop reason: SDUPTHER

## 2019-05-20 ENCOUNTER — OFFICE VISIT (OUTPATIENT)
Dept: FAMILY MEDICINE CLINIC | Facility: CLINIC | Age: 54
End: 2019-05-20

## 2019-05-20 VITALS
WEIGHT: 180 LBS | OXYGEN SATURATION: 98 % | HEART RATE: 80 BPM | DIASTOLIC BLOOD PRESSURE: 92 MMHG | TEMPERATURE: 97.9 F | SYSTOLIC BLOOD PRESSURE: 138 MMHG | BODY MASS INDEX: 31.89 KG/M2 | HEIGHT: 63 IN

## 2019-05-20 DIAGNOSIS — I10 ELEVATED BLOOD PRESSURE READING IN OFFICE WITH DIAGNOSIS OF HYPERTENSION: ICD-10-CM

## 2019-05-20 DIAGNOSIS — J01.00 ACUTE NON-RECURRENT MAXILLARY SINUSITIS: Primary | ICD-10-CM

## 2019-05-20 PROCEDURE — 99213 OFFICE O/P EST LOW 20 MIN: CPT | Performed by: NURSE PRACTITIONER

## 2019-05-20 RX ORDER — DOXYCYCLINE HYCLATE 100 MG/1
100 CAPSULE ORAL 2 TIMES DAILY
Qty: 20 CAPSULE | Refills: 0 | Status: SHIPPED | OUTPATIENT
Start: 2019-05-20 | End: 2019-06-14

## 2019-05-20 NOTE — PROGRESS NOTES
Subjective   Keke Stewart is a 54 y.o. female.   Chief Complaint   Patient presents with   • Sinusitis     Sinus Presssure      Vitals:    05/20/19 1506   BP: 138/92   Pulse: 80   Temp: 97.9 °F (36.6 °C)   SpO2: 98%     No LMP recorded (lmp unknown). Patient is postmenopausal.    Keke is here for an acute visit.       Sinus Problem   This is a new problem. The current episode started more than 1 month ago. The problem is unchanged. There has been no fever. The pain is moderate. Associated symptoms include congestion, sinus pressure, sneezing and a sore throat. Pertinent negatives include no chills, coughing, ear pain, hoarse voice or shortness of breath. Past treatments include acetaminophen (antihistamine, singulair ). The treatment provided mild relief.        The following portions of the patient's history were reviewed and updated as appropriate: allergies, current medications, past family history, past medical history, past social history, past surgical history and problem list.    Review of Systems   Constitutional: Negative for chills, fatigue and fever.   HENT: Positive for congestion, sinus pressure, sneezing and sore throat. Negative for ear pain and hoarse voice.    Respiratory: Negative for cough and shortness of breath.    Cardiovascular: Negative.    Genitourinary: Negative.        Objective   Physical Exam   Constitutional: Vital signs are normal. She appears well-developed and well-nourished. No distress.   HENT:   Right Ear: Tympanic membrane and ear canal normal.   Left Ear: Tympanic membrane and ear canal normal.   Nose: Mucosal edema and rhinorrhea present. Left sinus exhibits maxillary sinus tenderness.   Mouth/Throat: Uvula is midline. Posterior oropharyngeal erythema present.   Cardiovascular: Normal rate, regular rhythm and normal heart sounds.   Pulmonary/Chest: Effort normal and breath sounds normal.   Neurological: She is alert.       Assessment/Plan   Keke was seen today  for sinusitis.    Diagnoses and all orders for this visit:    Acute non-recurrent maxillary sinusitis    Elevated blood pressure reading in office with diagnosis of hypertension    Other orders  -     doxycycline (VIBRAMYCIN) 100 MG capsule; Take 1 capsule by mouth 2 (Two) Times a Day.      Follow up if symptoms persist, worsen or if new symptoms develop   Recommend CPE with labs and EKG

## 2019-06-14 ENCOUNTER — OFFICE VISIT (OUTPATIENT)
Dept: FAMILY MEDICINE CLINIC | Facility: CLINIC | Age: 54
End: 2019-06-14

## 2019-06-14 VITALS
HEART RATE: 88 BPM | DIASTOLIC BLOOD PRESSURE: 82 MMHG | WEIGHT: 173.8 LBS | SYSTOLIC BLOOD PRESSURE: 118 MMHG | BODY MASS INDEX: 30.79 KG/M2 | RESPIRATION RATE: 18 BRPM | HEIGHT: 63 IN | OXYGEN SATURATION: 99 % | TEMPERATURE: 98.1 F

## 2019-06-14 DIAGNOSIS — J01.91 ACUTE RECURRENT SINUSITIS, UNSPECIFIED LOCATION: Primary | ICD-10-CM

## 2019-06-14 PROCEDURE — 99214 OFFICE O/P EST MOD 30 MIN: CPT | Performed by: NURSE PRACTITIONER

## 2019-06-14 RX ORDER — METHYLPREDNISOLONE 4 MG/1
TABLET ORAL
Qty: 21 TABLET | Refills: 0 | Status: SHIPPED | OUTPATIENT
Start: 2019-06-14 | End: 2019-12-16

## 2019-06-14 NOTE — PROGRESS NOTES
Subjective   Keke Stewart is a 54 y.o. female.   Chief Complaint   Patient presents with   • SINUS PRESSURE   • Earache     LEFT EAR      Vitals:    06/14/19 0904   BP: 118/82   Pulse: 88   Resp: 18   Temp: 98.1 °F (36.7 °C)   SpO2: 99%     No LMP recorded (lmp unknown). Patient is postmenopausal.    History of Present Illness  Keke is here to discuss recurrent sinus issues. She was treated for a sinus infection with doxycycline on 5/20 and her symptoms resolved. She started with maxillary sinus pressure 6 days ago and congestion. She also c/o left ear pain. She has a history of allergic rhinitis and  is taking singulair, allegra d with mild relief. She denies fever, chills or body aches.     The following portions of the patient's history were reviewed and updated as appropriate: allergies, current medications, past family history, past medical history, past social history, past surgical history and problem list.    Review of Systems   Constitutional: Negative for chills, fatigue and fever.   HENT: Positive for ear pain and sinus pressure. Negative for postnasal drip, rhinorrhea and sore throat.    Eyes: Negative for visual disturbance.   Respiratory: Negative for cough, chest tightness and shortness of breath.    Cardiovascular: Negative.    Gastrointestinal: Negative for nausea.   Musculoskeletal: Negative for arthralgias.   Neurological: Positive for headaches. Negative for dizziness.       Objective   Physical Exam   Constitutional: Vital signs are normal. She appears well-developed and well-nourished. No distress.   HENT:   Right Ear: Tympanic membrane and ear canal normal.   Left Ear: Tympanic membrane and ear canal normal.   Nose: Mucosal edema present. No rhinorrhea. Left sinus exhibits maxillary sinus tenderness.   Cardiovascular: Normal rate, regular rhythm and normal heart sounds.   Pulmonary/Chest: Effort normal and breath sounds normal.   Neurological: She is alert.   Skin: Skin is warm, dry  and intact.       Assessment/Plan   Keke was seen today for sinus pressure and earache.    Diagnoses and all orders for this visit:    Acute recurrent sinusitis, unspecified location  -     CT Sinus Without Contrast; Future    Other orders  -     methylPREDNISolone (MEDROL, DAYA,) 4 MG tablet; Take as directed on package instructions.      Recommend saline nasal spray 4-5 times a day   Start medrol  Will check a CT scan of the sinuses for further evaluation of recurrent sinusitis and call her with results  May refer to allergy or ENT if needed

## 2019-06-21 ENCOUNTER — HOSPITAL ENCOUNTER (OUTPATIENT)
Dept: CT IMAGING | Facility: HOSPITAL | Age: 54
Discharge: HOME | End: 2019-06-21
Admitting: NURSE PRACTITIONER

## 2019-06-21 DIAGNOSIS — J01.91 ACUTE RECURRENT SINUSITIS, UNSPECIFIED LOCATION: ICD-10-CM

## 2019-06-21 PROCEDURE — 70486 CT MAXILLOFACIAL W/O DYE: CPT

## 2019-06-24 ENCOUNTER — TELEPHONE (OUTPATIENT)
Dept: FAMILY MEDICINE CLINIC | Facility: CLINIC | Age: 54
End: 2019-06-24

## 2019-06-24 NOTE — TELEPHONE ENCOUNTER
Talked to her and answered her questions       ----- Message from Ayanna Nugent sent at 6/24/2019 12:34 PM EDT -----  Pt called and said she spoke to you about her ct scan and is not sure she understands and has a few questions.    Please call pt back at [phone]    Thank you,   [facility]

## 2019-06-24 NOTE — TELEPHONE ENCOUNTER
----- Message from Ayanna Nugent sent at 6/24/2019 12:34 PM EDT -----  Pt called and said she spoke to you about her ct scan and is not sure she understands and has a few questions.    Please call pt back at [phone]    Thank you,   [facility]

## 2019-07-09 RX ORDER — MONTELUKAST SODIUM 10 MG/1
TABLET ORAL
Qty: 90 TABLET | Refills: 3 | Status: SHIPPED | OUTPATIENT
Start: 2019-07-09 | End: 2020-07-08 | Stop reason: SDUPTHER

## 2019-08-07 RX ORDER — RANITIDINE 150 MG/1
TABLET ORAL
Qty: 60 TABLET | Refills: 0 | Status: SHIPPED | OUTPATIENT
Start: 2019-08-07 | End: 2019-09-02 | Stop reason: SDUPTHER

## 2019-08-21 ENCOUNTER — APPOINTMENT (OUTPATIENT)
Dept: WOMENS IMAGING | Facility: HOSPITAL | Age: 54
End: 2019-08-21

## 2019-08-21 PROCEDURE — 77067 SCR MAMMO BI INCL CAD: CPT | Performed by: RADIOLOGY

## 2019-08-21 PROCEDURE — 77063 BREAST TOMOSYNTHESIS BI: CPT | Performed by: RADIOLOGY

## 2019-08-21 PROCEDURE — MDREVIEWSP: Performed by: RADIOLOGY

## 2019-09-03 RX ORDER — RANITIDINE 150 MG/1
TABLET ORAL
Qty: 60 TABLET | Refills: 2 | Status: SHIPPED | OUTPATIENT
Start: 2019-09-03 | End: 2019-12-10 | Stop reason: SDUPTHER

## 2019-10-24 ENCOUNTER — FLU SHOT (OUTPATIENT)
Dept: FAMILY MEDICINE CLINIC | Facility: CLINIC | Age: 54
End: 2019-10-24

## 2019-10-24 DIAGNOSIS — Z23 NEED FOR IMMUNIZATION AGAINST INFLUENZA: Primary | ICD-10-CM

## 2019-10-24 PROCEDURE — 90471 IMMUNIZATION ADMIN: CPT | Performed by: INTERNAL MEDICINE

## 2019-10-24 PROCEDURE — 90653 IIV ADJUVANT VACCINE IM: CPT | Performed by: INTERNAL MEDICINE

## 2019-12-10 RX ORDER — RANITIDINE 150 MG/1
TABLET ORAL
Qty: 60 TABLET | Refills: 0 | Status: SHIPPED | OUTPATIENT
Start: 2019-12-10 | End: 2019-12-18 | Stop reason: ALTCHOICE

## 2019-12-16 ENCOUNTER — OFFICE VISIT (OUTPATIENT)
Dept: INTERNAL MEDICINE | Facility: CLINIC | Age: 54
End: 2019-12-16

## 2019-12-16 VITALS
HEART RATE: 104 BPM | DIASTOLIC BLOOD PRESSURE: 90 MMHG | WEIGHT: 176 LBS | BODY MASS INDEX: 31.18 KG/M2 | HEIGHT: 63 IN | OXYGEN SATURATION: 98 % | SYSTOLIC BLOOD PRESSURE: 142 MMHG | TEMPERATURE: 98.4 F | RESPIRATION RATE: 18 BRPM

## 2019-12-16 DIAGNOSIS — R42 DIZZINESS: ICD-10-CM

## 2019-12-16 DIAGNOSIS — I10 ELEVATED BLOOD PRESSURE READING WITH DIAGNOSIS OF HYPERTENSION: ICD-10-CM

## 2019-12-16 DIAGNOSIS — J01.90 ACUTE SINUSITIS, RECURRENCE NOT SPECIFIED, UNSPECIFIED LOCATION: Primary | ICD-10-CM

## 2019-12-16 PROBLEM — Z23 NEED FOR IMMUNIZATION AGAINST INFLUENZA: Status: RESOLVED | Noted: 2019-10-24 | Resolved: 2019-12-16

## 2019-12-16 PROCEDURE — 99214 OFFICE O/P EST MOD 30 MIN: CPT | Performed by: NURSE PRACTITIONER

## 2019-12-16 RX ORDER — FLUTICASONE PROPIONATE 50 MCG
2 SPRAY, SUSPENSION (ML) NASAL DAILY
Qty: 1 BOTTLE | Refills: 1 | Status: SHIPPED | OUTPATIENT
Start: 2019-12-16 | End: 2020-01-15

## 2019-12-16 RX ORDER — DOXYCYCLINE 100 MG/1
100 CAPSULE ORAL 2 TIMES DAILY
Qty: 20 CAPSULE | Refills: 0 | Status: SHIPPED | OUTPATIENT
Start: 2019-12-16 | End: 2020-01-13

## 2019-12-16 RX ORDER — BENZONATATE 100 MG/1
100 CAPSULE ORAL 3 TIMES DAILY PRN
Qty: 30 CAPSULE | Refills: 1 | Status: SHIPPED | OUTPATIENT
Start: 2019-12-16 | End: 2020-01-13

## 2019-12-16 NOTE — PROGRESS NOTES
Subjective   Keke Stewart is a 54 y.o. female.   Chief Complaint   Patient presents with   • SINUS PRESSURE   • Headache   • Dizziness     Vitals:    12/16/19 0951   BP: 142/90   Pulse: 104   Resp: 18   Temp: 98.4 °F (36.9 °C)   SpO2: 98%     No LMP recorded (lmp unknown). Patient is postmenopausal.    Keke is here for an acute visit. She c/o dizziness, headache, and sinus congestion     Sinus Problem   This is a new problem. The problem is unchanged. There has been no fever. The pain is moderate. Associated symptoms include chills, congestion, coughing, headaches (frontal and temporal ) and sinus pressure. Pertinent negatives include no ear pain or shortness of breath. (Had dizziness with standing a few times ) Past treatments include oral decongestants. The treatment provided mild relief.        The following portions of the patient's history were reviewed and updated as appropriate: allergies, current medications, past family history, past medical history, past social history, past surgical history and problem list.    Review of Systems   Constitutional: Positive for chills and fatigue. Negative for unexpected weight change.   HENT: Positive for congestion and sinus pressure. Negative for ear pain.    Eyes: Negative for visual disturbance.   Respiratory: Positive for cough. Negative for chest tightness, shortness of breath and wheezing.    Cardiovascular: Negative.    Gastrointestinal: Negative.    Genitourinary: Negative.    Musculoskeletal: Negative for arthralgias.   Neurological: Positive for dizziness and headaches (frontal and temporal ). Negative for tremors, weakness and numbness.       Objective   Physical Exam   Constitutional: She is oriented to person, place, and time. She appears well-developed and well-nourished.   HENT:   Right Ear: A middle ear effusion is present.   Left Ear: A middle ear effusion is present.   Nose: Mucosal edema and rhinorrhea present. Right sinus exhibits maxillary  sinus tenderness. Left sinus exhibits maxillary sinus tenderness.   Mouth/Throat: Uvula is midline. Posterior oropharyngeal erythema present.   Cardiovascular: Normal rate, regular rhythm and normal heart sounds.   172/98   Pulmonary/Chest: Effort normal and breath sounds normal.   Neurological: She is alert and oriented to person, place, and time.   Skin: Skin is warm, dry and intact.   Psychiatric: She has a normal mood and affect.       Assessment/Plan   Keke was seen today for sinus pressure, headache and dizziness.    Diagnoses and all orders for this visit:    Acute sinusitis, recurrence not specified, unspecified location    Elevated blood pressure reading with diagnosis of hypertension    Dizziness    Other orders  -     doxycycline (MONODOX) 100 MG capsule; Take 1 capsule by mouth 2 (Two) Times a Day.  -     fluticasone (FLONASE) 50 MCG/ACT nasal spray; 2 sprays into the nostril(s) as directed by provider Daily for 30 days.  -     benzonatate (TESSALON PERLES) 100 MG capsule; Take 1 capsule by mouth 3 (Three) Times a Day As Needed for Cough.      Stop allegra D  Start flonase and doxycycline  Tessalon perles for cough  Her blood pressure is too high today, recheck was also elevated. She states it is because I am making her nervous. Recommend that she check it 1-2 times a day and records  Avoid nsaids, sudafed, caffeine,   I gave her a handout on monitoring her blood pressure at home  Follow up in 4 weeks for a BP check. She is also due for an physical with labs and EKG. I discussed the importance of regular physicals and having her blood pressure rechecked and treatment if needed. She was advised to call me if her blood pressure is >140/90  I discussed risk for stroke

## 2019-12-18 ENCOUNTER — OFFICE VISIT (OUTPATIENT)
Dept: FAMILY MEDICINE CLINIC | Facility: CLINIC | Age: 54
End: 2019-12-18

## 2019-12-18 VITALS
WEIGHT: 173.9 LBS | SYSTOLIC BLOOD PRESSURE: 140 MMHG | BODY MASS INDEX: 30.81 KG/M2 | RESPIRATION RATE: 16 BRPM | DIASTOLIC BLOOD PRESSURE: 70 MMHG | HEIGHT: 63 IN | HEART RATE: 102 BPM | OXYGEN SATURATION: 99 % | TEMPERATURE: 98.4 F

## 2019-12-18 DIAGNOSIS — R03.0 PREHYPERTENSION: ICD-10-CM

## 2019-12-18 DIAGNOSIS — R53.83 FATIGUE, UNSPECIFIED TYPE: ICD-10-CM

## 2019-12-18 DIAGNOSIS — K21.9 GASTROESOPHAGEAL REFLUX DISEASE, ESOPHAGITIS PRESENCE NOT SPECIFIED: ICD-10-CM

## 2019-12-18 DIAGNOSIS — Z13.21 ENCOUNTER FOR VITAMIN DEFICIENCY SCREENING: ICD-10-CM

## 2019-12-18 DIAGNOSIS — E66.9 CLASS 1 OBESITY WITH BODY MASS INDEX (BMI) OF 30.0 TO 30.9 IN ADULT, UNSPECIFIED OBESITY TYPE, UNSPECIFIED WHETHER SERIOUS COMORBIDITY PRESENT: ICD-10-CM

## 2019-12-18 DIAGNOSIS — J30.2 SEASONAL ALLERGIES: ICD-10-CM

## 2019-12-18 DIAGNOSIS — Z00.00 ANNUAL PHYSICAL EXAM: Primary | ICD-10-CM

## 2019-12-18 DIAGNOSIS — R05.9 COUGH: ICD-10-CM

## 2019-12-18 DIAGNOSIS — F41.9 ANXIETY: ICD-10-CM

## 2019-12-18 DIAGNOSIS — R73.9 HYPERGLYCEMIA: ICD-10-CM

## 2019-12-18 DIAGNOSIS — Z11.59 NEED FOR HEPATITIS C SCREENING TEST: ICD-10-CM

## 2019-12-18 PROBLEM — E66.811 CLASS 1 OBESITY WITH BODY MASS INDEX (BMI) OF 30.0 TO 30.9 IN ADULT: Status: ACTIVE | Noted: 2019-12-18

## 2019-12-18 PROCEDURE — 99214 OFFICE O/P EST MOD 30 MIN: CPT | Performed by: FAMILY MEDICINE

## 2019-12-18 PROCEDURE — 99396 PREV VISIT EST AGE 40-64: CPT | Performed by: FAMILY MEDICINE

## 2019-12-18 RX ORDER — OMEPRAZOLE 20 MG/1
20 CAPSULE, DELAYED RELEASE ORAL DAILY
Qty: 30 CAPSULE | Refills: 1 | Status: SHIPPED | OUTPATIENT
Start: 2019-12-18 | End: 2020-02-10

## 2019-12-18 NOTE — PATIENT INSTRUCTIONS
Patient Instructions    Recommend omeprazole 20mg daily, in the morning  Continue flonase, allegra, and singulair  Keep log of blood pressures  Lab work ordered, will call with results if any abnormalities, otherwise will discuss results in one month  Return to clinic in one month, bring log of blood pressures and your home blood pressure cuff

## 2019-12-18 NOTE — PROGRESS NOTES
Subjective   Keke Stewart is a 54 y.o. female.     Chief Complaint   Patient presents with   • Cough   • elvated b/p     pt stated b/p has been elevated       History of Present Illness   Patient presents for new patient exam.   She has history of anxiety and admits she is anxious before doctor's appointments and believes that is why her BP is elevated at times when she goes to the doctor's office. She checks her BP at home and it has been 130'/80's.   She has been having cough since last Tuesday.  Cough is non-productive.   The cough is worse at night. She had ear pain which has improved. She saw her previous physician two days ago and was prescribed doxycycline and tessalon perles for acute sinusitis which are not helping her cough, but has resolved her ear pain.   She is taking Flonase, Singulair at night and Allegra in the morning.  She denies sore throat, fevers, dyspnea, chest pain.   She has history of acid reflux. She has been taking ranitidine but she stopped taking it over the past 4 days.  She has history of seasonal allergies.  She has done cologard- which was negative, about two years ago. Will review records.  LMP was 6-7 years ago. She is UTD on pap test and mammogram- goes to Dr. Zamora on [address].  She is a never smoker.   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  She lives with her  and 20 year old daughter at home.       Past Medical History:   Diagnosis Date   • Headache      History reviewed. No pertinent surgical history.  Social History     Tobacco Use   • Smoking status: Never Smoker   • Smokeless tobacco: Never Used   Substance Use Topics   • Alcohol use: No   • Drug use: Not on file     Family History   Problem Relation Age of Onset   • Cancer Mother    • Cancer Maternal Aunt        Review of Systems   Constitutional: Negative for activity change, appetite change, fatigue and fever.   HENT: Positive for postnasal drip, rhinorrhea and sinus  "pressure. Negative for congestion, ear pain, sore throat and trouble swallowing.    Eyes: Negative for visual disturbance.   Respiratory: Positive for cough. Negative for chest tightness, shortness of breath and wheezing.    Cardiovascular: Negative for chest pain and leg swelling.   Gastrointestinal: Positive for GERD. Negative for abdominal pain, constipation, diarrhea, nausea and vomiting.   Endocrine: Negative for polydipsia and polyuria.   Genitourinary: Negative for vaginal bleeding.   Musculoskeletal: Negative for arthralgias, back pain and myalgias.   Skin: Negative for rash.   Neurological: Negative for dizziness and headache.   Psychiatric/Behavioral: Negative for depressed mood. The patient is nervous/anxious (Chronic).        Objective   /70   Pulse 102   Temp 98.4 °F (36.9 °C) (Oral)   Resp 16   Ht 160 cm (62.99\")   Wt 78.9 kg (173 lb 14.4 oz)   LMP  (LMP Unknown)   SpO2 99%   BMI 30.81 kg/m²     Physical Exam   Constitutional: She appears well-developed and well-nourished. No distress.   Pleasant female   HENT:   Head: Normocephalic.   Right Ear: Tympanic membrane, external ear and ear canal normal.   Left Ear: Tympanic membrane, external ear and ear canal normal.   Mouth/Throat: Oropharynx is clear and moist.   Eyes: Pupils are equal, round, and reactive to light. Conjunctivae and EOM are normal.   Neck: Normal range of motion. Neck supple.   Cardiovascular: Normal rate, regular rhythm, normal heart sounds and intact distal pulses.   No murmur heard.  Pulmonary/Chest: Effort normal and breath sounds normal. No respiratory distress. She has no wheezes. She has no rales.   Abdominal: Soft. Bowel sounds are normal. She exhibits no distension. There is no tenderness. There is no rebound and no guarding.   Musculoskeletal: Normal range of motion.   Lymphadenopathy:     She has no cervical adenopathy.   Neurological: She is alert.   Skin: Skin is warm and dry. Capillary refill takes less than " 2 seconds.   Psychiatric: She has a normal mood and affect.   Vitals reviewed.      Procedures    Assessment/Plan   Keke was seen today for cough and elvated b/p.    Diagnoses and all orders for this visit:    Annual physical exam  -     Comprehensive Metabolic Panel  -     Lipid Panel    Cough    Gastroesophageal reflux disease, esophagitis presence not specified    Seasonal allergies    Class 1 obesity with body mass index (BMI) of 30.0 to 30.9 in adult, unspecified obesity type, unspecified whether serious comorbidity present    Prehypertension    Anxiety    Encounter for vitamin deficiency screening  -     Vitamin D 25 Hydroxy    Hyperglycemia  -     Hemoglobin A1c    Need for hepatitis C screening test  -     Hepatitis C Antibody    Fatigue, unspecified type  -     CBC & Differential    Other orders  -     omeprazole (priLOSEC) 20 MG capsule; Take 1 capsule by mouth Daily.    Patient's cough is likely secondary to either seasonal allergies with post-nasal drip vs GERD. Will try trial of omeprazole. She has stopped ranitidine appropriately due to recall.  Will have her keep log of blood pressures and return in one month with log and home BP cuff.          Patient Instructions    Recommend omeprazole 20mg daily, in the morning  Continue flonase, allegra, and singulair  Keep log of blood pressures  Lab work ordered, will call with results if any abnormalities, otherwise will discuss results in one month  Return to clinic in one month, bring log of blood pressures and your home blood pressure cuff

## 2019-12-19 LAB
25(OH)D3+25(OH)D2 SERPL-MCNC: 38 NG/ML (ref 30–100)
ALBUMIN SERPL-MCNC: 4.7 G/DL (ref 3.5–5.2)
ALBUMIN/GLOB SERPL: 1.8 G/DL
ALP SERPL-CCNC: 122 U/L (ref 39–117)
ALT SERPL-CCNC: 17 U/L (ref 1–33)
AST SERPL-CCNC: 21 U/L (ref 1–32)
BASOPHILS # BLD AUTO: 0.04 10*3/MM3 (ref 0–0.2)
BASOPHILS NFR BLD AUTO: 0.9 % (ref 0–1.5)
BILIRUB SERPL-MCNC: 0.4 MG/DL (ref 0.2–1.2)
BUN SERPL-MCNC: 7 MG/DL (ref 6–20)
BUN/CREAT SERPL: 9.2 (ref 7–25)
CALCIUM SERPL-MCNC: 9.6 MG/DL (ref 8.6–10.5)
CHLORIDE SERPL-SCNC: 102 MMOL/L (ref 98–107)
CHOLEST SERPL-MCNC: 162 MG/DL (ref 0–200)
CO2 SERPL-SCNC: 30.8 MMOL/L (ref 22–29)
CREAT SERPL-MCNC: 0.76 MG/DL (ref 0.57–1)
EOSINOPHIL # BLD AUTO: 0.04 10*3/MM3 (ref 0–0.4)
EOSINOPHIL NFR BLD AUTO: 0.9 % (ref 0.3–6.2)
ERYTHROCYTE [DISTWIDTH] IN BLOOD BY AUTOMATED COUNT: 11.6 % (ref 12.3–15.4)
GLOBULIN SER CALC-MCNC: 2.6 GM/DL
GLUCOSE SERPL-MCNC: 114 MG/DL (ref 65–99)
HBA1C MFR BLD: 5.5 % (ref 4.8–5.6)
HCT VFR BLD AUTO: 39.1 % (ref 34–46.6)
HCV AB S/CO SERPL IA: <0.1 S/CO RATIO (ref 0–0.9)
HDLC SERPL-MCNC: 61 MG/DL (ref 40–60)
HGB BLD-MCNC: 13.4 G/DL (ref 12–15.9)
IMM GRANULOCYTES # BLD AUTO: 0.01 10*3/MM3 (ref 0–0.05)
IMM GRANULOCYTES NFR BLD AUTO: 0.2 % (ref 0–0.5)
LDLC SERPL CALC-MCNC: 89 MG/DL (ref 0–100)
LYMPHOCYTES # BLD AUTO: 1.04 10*3/MM3 (ref 0.7–3.1)
LYMPHOCYTES NFR BLD AUTO: 23.9 % (ref 19.6–45.3)
MCH RBC QN AUTO: 30.4 PG (ref 26.6–33)
MCHC RBC AUTO-ENTMCNC: 34.3 G/DL (ref 31.5–35.7)
MCV RBC AUTO: 88.7 FL (ref 79–97)
MONOCYTES # BLD AUTO: 0.43 10*3/MM3 (ref 0.1–0.9)
MONOCYTES NFR BLD AUTO: 9.9 % (ref 5–12)
NEUTROPHILS # BLD AUTO: 2.8 10*3/MM3 (ref 1.7–7)
NEUTROPHILS NFR BLD AUTO: 64.2 % (ref 42.7–76)
NRBC BLD AUTO-RTO: 0 /100 WBC (ref 0–0.2)
PLATELET # BLD AUTO: 253 10*3/MM3 (ref 140–450)
POTASSIUM SERPL-SCNC: 4 MMOL/L (ref 3.5–5.2)
PROT SERPL-MCNC: 7.3 G/DL (ref 6–8.5)
RBC # BLD AUTO: 4.41 10*6/MM3 (ref 3.77–5.28)
SODIUM SERPL-SCNC: 145 MMOL/L (ref 136–145)
TRIGL SERPL-MCNC: 62 MG/DL (ref 0–150)
VLDLC SERPL CALC-MCNC: 12.4 MG/DL
WBC # BLD AUTO: 4.36 10*3/MM3 (ref 3.4–10.8)

## 2019-12-23 ENCOUNTER — TELEPHONE (OUTPATIENT)
Dept: FAMILY MEDICINE CLINIC | Facility: CLINIC | Age: 54
End: 2019-12-23

## 2019-12-26 ENCOUNTER — TELEPHONE (OUTPATIENT)
Dept: FAMILY MEDICINE CLINIC | Facility: CLINIC | Age: 54
End: 2019-12-26

## 2019-12-26 ENCOUNTER — OFFICE VISIT (OUTPATIENT)
Dept: FAMILY MEDICINE CLINIC | Facility: CLINIC | Age: 54
End: 2019-12-26

## 2019-12-26 ENCOUNTER — HOSPITAL ENCOUNTER (OUTPATIENT)
Dept: GENERAL RADIOLOGY | Facility: HOSPITAL | Age: 54
Discharge: HOME | End: 2019-12-26
Admitting: FAMILY MEDICINE

## 2019-12-26 VITALS
RESPIRATION RATE: 16 BRPM | TEMPERATURE: 97.7 F | WEIGHT: 175.5 LBS | DIASTOLIC BLOOD PRESSURE: 102 MMHG | OXYGEN SATURATION: 99 % | HEIGHT: 63 IN | BODY MASS INDEX: 31.1 KG/M2 | HEART RATE: 97 BPM | SYSTOLIC BLOOD PRESSURE: 144 MMHG

## 2019-12-26 DIAGNOSIS — R03.0 WHITE COAT SYNDROME WITHOUT DIAGNOSIS OF HYPERTENSION: ICD-10-CM

## 2019-12-26 DIAGNOSIS — J34.89 SINUS PRESSURE: Primary | ICD-10-CM

## 2019-12-26 DIAGNOSIS — R03.0 PREHYPERTENSION: ICD-10-CM

## 2019-12-26 PROCEDURE — 99214 OFFICE O/P EST MOD 30 MIN: CPT | Performed by: FAMILY MEDICINE

## 2019-12-26 PROCEDURE — 70220 X-RAY EXAM OF SINUSES: CPT

## 2019-12-26 NOTE — TELEPHONE ENCOUNTER
Upon speaking with pt advised her she is taking B/p To much she just needs to log 3x a day. Advised her she is scaring herself into high B/P and needs to just relax and breath, as she was crying and frantic. I advised pt It was ok and she just needed to breath and relax and do something that soothed her while taking her b/p (read a book, watch her favorite television show, anything that sooths her. Pt understood. Dr. Mccray has been advised of conversation.

## 2019-12-26 NOTE — TELEPHONE ENCOUNTER
Pt will like a call back to go over her concerns with bp. Her last reading was 145/92 and she states that it has been fluctuating. Please advise

## 2019-12-26 NOTE — PATIENT INSTRUCTIONS
Patient Instructions    Continue Flonase, and Singulair. Recommend stopping Allegra and trying Claritin low dose (5mg) daily.  X-ray of the sinuses ordered, will call with results.   Will consider ENT referral.   At follow up appointment on 1/17/20 , bring log of pressures and bring your home blood pressure cuff.  Call me if your blood pressures increase above 140/90.

## 2019-12-26 NOTE — PROGRESS NOTES
"Ta Stewart is a 54 y.o. female.     Chief Complaint   Patient presents with   • Nasal Congestion     pt states temple pressure and states has been going on for about a week    • Cough       History of Present Illness   Patient presents w/ c/o sinus pressure and congestion. She has post-nasal drip with cough. She complains of feeling \"wobbly\". Denies room spinning. States she feels light-headed. Denies syncope. She states it is not a constant problem unless she focuses on it. No pain, no ringing of the ears. No shooting pains. No jaw claudication. No blurry vision. She has trouble breathing through her nose at times.  She takes allegra in the morning, Singulair at night, and Flonase at night. She was recently treated with doxycyline 10 day course for acute sinusitis. She missed her last two doses of doxycycline yesterday.   She saw Dr. Isbell (ENT) on 7/9/19 and she was sent to Dr. Del Real (neurologist) in August and was told she has trigeminal neuralgia. CT scan of the sinuses showed no significant sinus issues. She states that her symptoms now are different compared to then, more concentrated around the nose whereas the trigeminal neuralgia was more lateral.       Past Medical History:   Diagnosis Date   • Headache      History reviewed. No pertinent surgical history.  Social History     Tobacco Use   • Smoking status: Never Smoker   • Smokeless tobacco: Never Used   Substance Use Topics   • Alcohol use: No   • Drug use: Not on file     Family History   Problem Relation Age of Onset   • Cancer Mother    • Cancer Maternal Aunt        Review of Systems   Constitutional: Negative for activity change, appetite change, fatigue and fever.   HENT: Positive for congestion, postnasal drip, rhinorrhea and sinus pressure. Negative for dental problem, drooling, ear discharge, ear pain, facial swelling, hearing loss, mouth sores, nosebleeds, sneezing, sore throat, swollen glands, tinnitus, trouble swallowing and " "voice change.    Respiratory: Positive for cough. Negative for chest tightness, shortness of breath, wheezing and stridor.    Cardiovascular: Negative for chest pain and leg swelling.   Gastrointestinal: Negative for abdominal pain, constipation, diarrhea, nausea and vomiting.   Skin: Negative for rash.   Neurological: Positive for light-headedness. Negative for headache.   Psychiatric/Behavioral: The patient is nervous/anxious.        Objective   BP (!) 144/102   Pulse 97   Temp 97.7 °F (36.5 °C) (Oral)   Resp 16   Ht 160 cm (62.99\")   Wt 79.6 kg (175 lb 8 oz)   LMP  (LMP Unknown)   SpO2 99%   BMI 31.10 kg/m²     Physical Exam   Constitutional: She appears well-developed and well-nourished. No distress.   HENT:   Head: Normocephalic.   Right Ear: Tympanic membrane, external ear and ear canal normal.   Left Ear: Tympanic membrane, external ear and ear canal normal.   Nose: Rhinorrhea present. Left sinus exhibits maxillary sinus tenderness and frontal sinus tenderness.   Mouth/Throat: Oropharynx is clear and moist.   Eyes: Pupils are equal, round, and reactive to light. Conjunctivae and EOM are normal.   Neck: Normal range of motion. Neck supple.   Cardiovascular: Normal rate, regular rhythm, normal heart sounds and intact distal pulses.   No murmur heard.  Pulmonary/Chest: Effort normal and breath sounds normal. No respiratory distress. She has no wheezes. She has no rales.   Musculoskeletal: Normal range of motion.   Lymphadenopathy:     She has no cervical adenopathy.   Neurological: She is alert.   Skin: Skin is warm and dry. Capillary refill takes less than 2 seconds.   Psychiatric: Her mood appears anxious.       Procedures    Assessment/Plan   There are no diagnoses linked to this encounter.      Patient's blood pressure is very high today. She is extremely anxious. Rechecked BP 15 minutes later and it was 180/100 (right antecubital).   Patient states adamantly that her BP at home is 130/80s and her " daughter is in nursing school and checks it manually as well as using her home BP cuff. She did not bring her log of pressures in today or the BP cuff.  She checks her BP daily at home, and was instructed to return after one month (1/16/2020) with report of pressures and cuff at her previous PCP's office. She has a follow up apt with me on 1/17/19. Will plan to have her come back then to see log of pressures and check home BP cuff. Instructed patient to call me if pressures increase over 140/90.   She may have white coat hypertension on account of her anxiety. I emphasized importance of follow up and bringing her home BP log and cuff.   Will order X-ray of sinuses to see if there is any evidence of sinusitis. Advised stopping allegra since it can cause headache/dizziness/rhinorrhea. Will try low dose Claritin instead.         Patient Instructions    Continue Flonase, and Singulair. Recommend stopping Allegra and trying Claritin low dose (5mg) daily.  X-ray of the sinuses ordered, will call with results.   Will consider ENT referral.   At follow up appointment on 1/17/20 , bring log of pressures and bring your home blood pressure cuff.  Call me if your blood pressures increase above 140/90.

## 2020-01-03 ENCOUNTER — TELEPHONE (OUTPATIENT)
Dept: FAMILY MEDICINE CLINIC | Facility: CLINIC | Age: 55
End: 2020-01-03

## 2020-01-03 NOTE — TELEPHONE ENCOUNTER
Pt wants to know if she can take ibuprofen without it elevating her blood pressure. Her ears have been hurting. Please advise

## 2020-01-16 ENCOUNTER — OFFICE VISIT (OUTPATIENT)
Dept: FAMILY MEDICINE CLINIC | Facility: CLINIC | Age: 55
End: 2020-01-16

## 2020-01-16 VITALS
TEMPERATURE: 98.3 F | BODY MASS INDEX: 30.65 KG/M2 | HEIGHT: 63 IN | DIASTOLIC BLOOD PRESSURE: 80 MMHG | OXYGEN SATURATION: 99 % | SYSTOLIC BLOOD PRESSURE: 126 MMHG | WEIGHT: 173 LBS | RESPIRATION RATE: 16 BRPM | HEART RATE: 103 BPM

## 2020-01-16 DIAGNOSIS — J34.2 DEVIATED NASAL SEPTUM: Primary | ICD-10-CM

## 2020-01-16 DIAGNOSIS — E66.9 CLASS 1 OBESITY WITH BODY MASS INDEX (BMI) OF 30.0 TO 30.9 IN ADULT, UNSPECIFIED OBESITY TYPE, UNSPECIFIED WHETHER SERIOUS COMORBIDITY PRESENT: ICD-10-CM

## 2020-01-16 PROCEDURE — 99213 OFFICE O/P EST LOW 20 MIN: CPT | Performed by: FAMILY MEDICINE

## 2020-01-16 NOTE — PROGRESS NOTES
"Ta Stewart is a 54 y.o. female.     Chief Complaint   Patient presents with   • Hypertension     f/h HTN   • Sinus Problem     still having some congestion issues        History of Present Illness   Patient presents for follow up for elevated BP. Her BP has been running between 120-130's/70's-80's. BP today in clinic is 126/80. No anti-hypertensives have been prescribed at this point- her elevated BP was thought likely to be due to high stress and anxiety.  She states she has been exercising regularly which is helping her anxiety and stress level.     She complains of \"sinus congestion\", and not being able to breathe through he nose. This has been an ongoing problem since last summer- she has had CT sinuses in the summer, and X-ray sinuses in December to see if there was any obvious change. CT scan showed minimal mucosal thickening in left maxillary sinus , retention cyst in right maxillary sinus, and deviation of nasal septum bilaterally.  She complains of pressure in her nose and mid-forehead and says her nose will \"throb\" and her ears will pop frequently. She feels like she cannot breathe through her nose 95% of the time- both sides.   She has been taking Claritin 5mg in the morning instead of the Allegra because the Allegra did not seem to be helping- but has not noticed improvement. She also takes Flonase and Singulair at night which is not helping.  She was seen at [facility] in June- July 2019 and was told she likely has trigeminal neuralgia. (On account of the pain she was having at the time which was located bilaterally from temples to zygoma) She went to neurologist who started her on gabapentin which made her unable to function. The pain she states is maybe 1-2/10 currently, not as bad as before.   Denies fevers, cough, SOB.           Past Medical History:   Diagnosis Date   • Headache      History reviewed. No pertinent surgical history.  Social History     Tobacco Use   • " "Smoking status: Never Smoker   • Smokeless tobacco: Never Used   Substance Use Topics   • Alcohol use: No   • Drug use: Not on file     Family History   Problem Relation Age of Onset   • Cancer Mother    • Cancer Maternal Aunt        Review of Systems   Constitutional: Negative for activity change, appetite change, fatigue and fever.   HENT: Positive for congestion and sinus pressure. Negative for ear pain, postnasal drip, rhinorrhea, sneezing, sore throat, swollen glands and trouble swallowing.    Respiratory: Negative for cough, chest tightness and shortness of breath.    Cardiovascular: Negative for chest pain.   Gastrointestinal: Negative for abdominal pain and diarrhea.       Objective   /80   Pulse 103   Temp 98.3 °F (36.8 °C)   Resp 16   Ht 160 cm (62.99\")   Wt 78.5 kg (173 lb)   LMP  (LMP Unknown)   SpO2 99%   BMI 30.66 kg/m²     Physical Exam   Constitutional: She appears well-developed and well-nourished. No distress.   HENT:   Head: Normocephalic and atraumatic.   Right Ear: External ear normal.   Left Ear: External ear normal.   Nose: Mucosal edema and septal deviation present. Right sinus exhibits no maxillary sinus tenderness and no frontal sinus tenderness. Left sinus exhibits maxillary sinus tenderness and frontal sinus tenderness.   Mouth/Throat: Oropharynx is clear and moist.   Eyes: Conjunctivae are normal.   Cardiovascular: Normal rate, regular rhythm, normal heart sounds and intact distal pulses.   Pulmonary/Chest: Effort normal and breath sounds normal. No respiratory distress.   Neurological: She is alert.   Skin: Skin is warm and dry.   Psychiatric: She has a normal mood and affect.   Vitals reviewed.      Procedures    Assessment/Plan   Keke was seen today for hypertension and sinus problem.    Diagnoses and all orders for this visit:    Deviated nasal septum  -     Ambulatory Referral to ENT (Otolaryngology)    Class 1 obesity with body mass index (BMI) of 30.0 to 30.9 in " adult, unspecified obesity type, unspecified whether serious comorbidity present      Patient may need to have surgery for deviated nasal septum. Will refer to [facility] for second opinion.          Patient Instructions    Will place referral to [facility] for second opinion.  Great job exercising- keep up the good work!  Return to clinic in 4 weeks for follow up, sooner if needed.

## 2020-01-16 NOTE — PATIENT INSTRUCTIONS
Patient Instructions    Will place referral to [facility] for second opinion.  Great job exercising- keep up the good work!  Return to clinic in 4 weeks for follow up, sooner if needed.

## 2020-01-22 ENCOUNTER — TELEPHONE (OUTPATIENT)
Dept: FAMILY MEDICINE CLINIC | Facility: CLINIC | Age: 55
End: 2020-01-22

## 2020-01-22 NOTE — TELEPHONE ENCOUNTER
Pt went to ENT today per your referral and she doesn't want to follow the advise of ENT, she is going to cancel her follow up appt with you. She says she will follow up as needed later.     ABENA

## 2020-02-10 RX ORDER — FLUTICASONE PROPIONATE 50 MCG
SPRAY, SUSPENSION (ML) NASAL
Qty: 16 G | OUTPATIENT
Start: 2020-02-10

## 2020-02-10 RX ORDER — OMEPRAZOLE 20 MG/1
20 CAPSULE, DELAYED RELEASE ORAL DAILY
Qty: 30 CAPSULE | Refills: 1 | Status: SHIPPED | OUTPATIENT
Start: 2020-02-10 | End: 2020-03-14

## 2020-03-09 ENCOUNTER — TELEPHONE (OUTPATIENT)
Dept: FAMILY MEDICINE CLINIC | Facility: CLINIC | Age: 55
End: 2020-03-09

## 2020-03-09 ENCOUNTER — DOCUMENTATION (OUTPATIENT)
Dept: FAMILY MEDICINE CLINIC | Facility: CLINIC | Age: 55
End: 2020-03-09

## 2020-03-09 NOTE — TELEPHONE ENCOUNTER
PT CALLED SAYING THAT HER MEDICATION omeprazole (priLOSEC) 20 MG capsule NEEDS A PA MEDICATION NECESSITY TO OVERRIDE THE QUANTITY LIMITATION THAT THE RX HAS. THEY TOLD HER THAT SHE CAN ONLY GET IT FOR 90 DAYS FOR THE YR. [phone]

## 2020-03-09 NOTE — PROGRESS NOTES
Left pt voicemail stating that she needs a change of medication due to insurance not covering her current medication. OK per HIPAA.

## 2020-03-10 NOTE — TELEPHONE ENCOUNTER
Left pt voicemail regarding priLOSEC 20 MG capsule coupons to print out and use on [employer].    Physical Exam: 


SUBJECTIVE: Patient seen and examined at bedside. he had 2 BM with maroon 

colored stool last night per nurse. patient is asymptomatic and is asking to go 

home. He denies any fever, chills, lightheadedness, SOB, chest pain, N/V/D/C. 

His hip pain is better today.








OBJECTIVE:





 Vital Signs











 Period  Temp  Pulse  Resp  BP Sys/[place]  Pulse Ox


 


 Last 24 Hr  97.6 F-98.7 F  63-79  16-20  /47-60  











GENERAL: The patient is awake, alert, and fully oriented, in no acute distress.


HEAD: Normal with no signs of trauma.


EYES:  sclera anicteric, conjunctiva pallor. No ptosis. 


ENT:dry mucous membranes.





LUNGS: Breath sounds equal, clear to auscultation bilaterally, no wheezes, no 

crackles, no 


accessory muscle use. 


HEART: Regular rate and rhythm, S1, S2 2/6 systolic  murmur,no  rub or gallop.


ABDOMEN: Soft, nontender, nondistended, normoactive bowel sounds, no guarding, 

no 


rebound.


EXTREMITIES: warm, well-perfused, no edema. 


NEUROLOGICAL:good mentation 


PSYCH: Normal mood, normal affect.


SKIN: Warm, dry,  no rashes or lesions noted














 Laboratory Results - last 24 hr











  09/19/17 09/21/17 09/21/17





  22:36 05:10 19:22


 


WBC    7.4  D


 


RBC    2.23 L


 


Hgb    7.2 L


 


Hct    21.0 L


 


MCV    94.0


 


MCH    32.5


 


MCHC    34.5


 


RDW    16.7 H


 


Plt Count    85 L D


 


MPV    9.5


 


Neutrophils %   


 


Lymphocytes %   


 


Monocytes %   


 


Eosinophils %   


 


Basophils %   


 


Sodium   


 


Potassium   


 


Chloride   


 


Carbon Dioxide   


 


Anion Gap   


 


BUN   


 


Creatinine   


 


Creat Clearance w eGFR   


 


Random Glucose   


 


Calcium   


 


Phosphorus   


 


Magnesium   


 


Iron   138 


 


TIBC   182 L 


 


Iron Saturation   76 H 


 


Total Bilirubin   


 


AST   


 


ALT   


 


Alkaline Phosphatase   


 


LD Total   


 


Total Protein   


 


Albumin   


 


Urine Color  Straw  


 


Urine Appearance  Clear  


 


Urine pH  5.0  


 


Ur Specific Gravity  1.010  


 


Urine Protein  Negative  


 


Urine Glucose (UA)  Negative  


 


Urine Ketones  Negative  


 


Urine Blood  3+ H  


 


Urine Nitrite  Negative  


 


Urine Bilirubin  Negative  


 


Urine Urobilinogen  Negative  


 


Urine RBC  <1  


 


Urine WBC  <1  


 


Ur Epithelial Cells  Rare  


 


Urine Bacteria  Rare  


 


Urine Mucus  Rare  














  09/22/17 09/22/17 09/22/17





  03:00 05:00 05:00


 


WBC  7.8  7.2 


 


RBC  2.29 L  2.27 L 


 


Hgb  7.3 L  7.3 L 


 


Hct  20.9 L  20.8 L 


 


MCV  91.3  91.5 


 


MCH  32.1  31.9 


 


MCHC  35.2  34.9 


 


RDW  15.9  16.5 H 


 


Plt Count  63 L D  65 L 


 


MPV  9.2  9.5 


 


Neutrophils %   74.8 


 


Lymphocytes %   10.7  D 


 


Monocytes %   6.1 


 


Eosinophils %   8.0 H 


 


Basophils %   0.4 


 


Sodium    140


 


Potassium    4.1


 


Chloride    104


 


Carbon Dioxide    27


 


Anion Gap    9


 


BUN    58 H


 


Creatinine    1.4 H D


 


Creat Clearance w eGFR    47.83


 


Random Glucose    85


 


Calcium    8.1 L


 


Phosphorus    2.3 L


 


Magnesium    1.9


 


Iron   


 


TIBC   


 


Iron Saturation   


 


Total Bilirubin    0.6


 


AST    19


 


ALT    11 L


 


Alkaline Phosphatase    59


 


LD Total    195


 


Total Protein    4.3 L


 


Albumin    2.2 L


 


Urine Color   


 


Urine Appearance   


 


Urine pH   


 


Ur Specific Gravity   


 


Urine Protein   


 


Urine Glucose (UA)   


 


Urine Ketones   


 


Urine Blood   


 


Urine Nitrite   


 


Urine Bilirubin   


 


Urine Urobilinogen   


 


Urine RBC   


 


Urine WBC   


 


Ur Epithelial Cells   


 


Urine Bacteria   


 


Urine Mucus   








Active Medications











Generic Name Dose Route Start Last Admin





  Trade Name Freq  PRN Reason Stop Dose Admin


 


Acetylcysteine  1,200 mg 09/22/17 15:15  





  Mucomyst 20 Oral / Inh Use Only*  PO 09/24/17 15:14  





  BID ALCIDES   


 


Albuterol Sulfate  1 amp 09/21/17 18:26  





  Ventolin 0.083% Nebulizer Soln -  NEB   





  Q6H PRN   





  SHORT OF BREATH/WHEEZING   


 


Atorvastatin Calcium  10 mg 09/21/17 22:00 09/21/17 22:34





  Lipitor -  PO   10 mg





  HS ALCIDES   Administration


 


Carvedilol  3.125 mg 09/21/17 22:00 09/22/17 11:03





  Coreg -  PO   Not Given





  BID ALCIDES   


 


Chlorhexidine Gluconate  1 applic 09/21/17 22:00 09/21/17 22:34





  Hibiclens For Decolonization -  TP   1 applic





  HS ALCIDES   Administration


 


Docusate Sodium  300 mg 09/21/17 22:00 09/21/17 22:35





  Colace -  PO   300 mg





  HS ALCIDES   Administration


 


Pantoprazole Sodium 80 mg/  100 mls @ 10 mls/hr 09/22/17 10:00 09/22/17 11:16





  Sodium Chloride  IVPB   10 mls/hr





  Q10H ALCIDES   Administration





  8 MG/HR   


 


Mupirocin  1 applic 09/21/17 22:00 09/22/17 10:00





  Bactroban Ointment (For Decolonization) -  NS   1 applic





  BID ALCIDES   Administration


 


Senna  2 tab 09/21/17 22:00 09/21/17 22:35





  Senna -  PO   2 tab





  HS ALCIDES   Administration











ASSESSMENT/PLAN


86M w/ extensive PMH CAD, S/P stent , CKD, AS, choledocholithiasis and 

cholangitis, most recently 09/02 s/p ERCP and CBD stent removal, who presented 

with mechanical  fall, found to have a Hgb of 6.3 and melena in the ED, 

admitted to the ICU for suspected upper GI bleed.





#GI bleed- likely upper, r/o lower(unlikely)


   - total of 6  units of PRBCs has been transfused 


   -lasix 20mg IV  daily 


         -vitk , DDAVP,protonix  40 BID


   -GI on board- Dr. Terrazas,  EGD showed 2 gastric ulcers in the fundus with 

no active bleeding or visible vessel. possible colonoscopy today vs     

bleeding scan 


         - 2 large bore 


          -NPO 


   -surgery on board- Dr. Swann


   -cardiology on board- Dr. Botello


   -hematology on board- Dr. Dye


   -f/u CBC after 6 units are given, another unit will be transfer today , 


   -f/u CBC, CMP, Mg, Ph, PT/PTT/INR in am


   -f/u UCx, Bcx, CXR


# thrombocytopenia


*  continuing to trend down. 


* hematology evaluating with plans to transfuse platelets as pt is actively 

bleeding and source unknown. 


* HIT assay and autoimmune workup. 


   


#fall on buttocks, most likely mechanical, vs hypotension vs  acute anemia 


* complain of pain in the left hip


*  denies any dizzines or loss of consciousness before and after the fall, 

denies to heat his head


*  pelvic XR  negative for fracture


*  consider CT if pain persist


* pain control 


* PT when stable 





#HTN


* currently hypotensive 


* will monitor closely 


* will give IV fluids as needed


* start carvedilol,   (hold if systolic BP < 130 OR HR < 60)





# CKD, stable 


* at his base line 


#CAD


* hold atorvastatin


* Hold blavix for now due to bleeding 


* Cardiology recommend to use ASA instead of plavix when he is stable and 

hemoglobin are stable.2 weeks after GI follow up 


* 


#Pulmonary nodule


*  incidental 5mm LLL pulmonary nodule.


*  will need CT scan to further evaluate


* F/U as out patient 





#Urethral Strictures s/p Dilation


* hold finasteride and flomax





#Hx of choledocholithiasis


* hold ursodiol





#constipation


* continue  colace, senna  





#AS,


*  f/u as out patient 


   


#Porcine Valve Replacement


* F/U as out patient 








#FEN/PPx


   -no fluids


   -electrolytes wnl


   - advanced to low sodium diet  


   -protonix 40 BID


   -SCDs, no pharmacology anticoagulation for now 





#Dispo


* Admit to ICU due to upper GI bleed


* consider transfer to med surg when Hgb stable 








Visit type





- Emergency Visit


Emergency Visit: Yes


ED Registration Date: 09/19/17


Care time: The patient presented to the Emergency Department on the above date 

and was hospitalized for further evaluation of their emergent condition.





- New Patient


This patient is new to me today: No





- Critical Care


Critical Care patient: Yes


Total Critical Care Time (in minutes): 40


Critical Care Statement: The care of this patient involved high complexity 

decision making to prevent further life threatening deterioration of the patient

's condition and/or to evaluate & treat vital organ system(s) failure or risk 

of failure.

## 2020-03-13 NOTE — TELEPHONE ENCOUNTER
Spoke to pt regarding PA for priLOSEC. She is still wanting to know if there is any alternative that she could take instead. I gave her options for [employer] and the prices of them, but she still wanted me to ask you if there was an alternative you could prescribe her.

## 2020-03-13 NOTE — TELEPHONE ENCOUNTER
PT CALLED BACK IN REGARDS TO HER PRIOR AUTH AND STATES SHE WAS ADVISED TO CALL BACK TODAY TO CHECK THE STATUES ON IT AND WOULD LIKE TO GET A CALL BACK -[phone]

## 2020-03-14 RX ORDER — OMEPRAZOLE 20 MG/1
20 CAPSULE, DELAYED RELEASE ORAL DAILY
Qty: 30 CAPSULE | Refills: 5 | Status: SHIPPED | OUTPATIENT
Start: 2020-03-14 | End: 2020-04-08

## 2020-04-08 RX ORDER — FLUTICASONE PROPIONATE 50 MCG
SPRAY, SUSPENSION (ML) NASAL
Qty: 4 BOTTLE | Refills: 5 | Status: SHIPPED | OUTPATIENT
Start: 2020-04-08

## 2020-04-08 RX ORDER — OMEPRAZOLE 20 MG/1
CAPSULE, DELAYED RELEASE ORAL
Qty: 30 CAPSULE | Refills: 5 | Status: SHIPPED | OUTPATIENT
Start: 2020-04-08 | End: 2020-07-29

## 2020-07-08 RX ORDER — MONTELUKAST SODIUM 10 MG/1
10 TABLET ORAL
Qty: 90 TABLET | Refills: 1 | Status: SHIPPED | OUTPATIENT
Start: 2020-07-08 | End: 2021-01-08

## 2020-07-08 NOTE — TELEPHONE ENCOUNTER
Caller: Keke Stewart    Relationship: Self    Best call back number: [phone]    Medication needed:   Requested Prescriptions     Pending Prescriptions Disp Refills   • montelukast (SINGULAIR) 10 MG tablet 90 tablet 3     Sig: Take 1 tablet by mouth every night at bedtime.       When do you need the refill by: 7/9/20    What details did the patient provide when requesting the medication: n/a    Does the patient have less than a 3 day supply:  [x] Yes  [] No    What is the patient's preferred pharmacy:      [id number] [place], [place] - [address] AT [facility] - [phone] [facility] [phone]

## 2020-07-16 RX ORDER — CLOTRIMAZOLE 1 %
CREAM (GRAM) TOPICAL
Qty: 28 G | Refills: 0 | Status: SHIPPED | OUTPATIENT
Start: 2020-07-16 | End: 2023-01-13

## 2020-07-17 ENCOUNTER — TELEPHONE (OUTPATIENT)
Dept: FAMILY MEDICINE CLINIC | Facility: CLINIC | Age: 55
End: 2020-07-17

## 2020-07-17 ENCOUNTER — LAB (OUTPATIENT)
Dept: LAB | Facility: HOSPITAL | Age: 55
End: 2020-07-17

## 2020-07-17 DIAGNOSIS — R10.30 LOWER ABDOMINAL PAIN: ICD-10-CM

## 2020-07-17 LAB
ALBUMIN SERPL-MCNC: 4.2 G/DL (ref 3.5–5.2)
ALBUMIN/GLOB SERPL: 1.6 G/DL
ALP SERPL-CCNC: 137 U/L (ref 39–117)
ALT SERPL W P-5'-P-CCNC: 27 U/L (ref 1–33)
ANION GAP SERPL CALCULATED.3IONS-SCNC: 9.8 MMOL/L (ref 5–15)
AST SERPL-CCNC: 21 U/L (ref 1–32)
BACTERIA UR QL AUTO: NORMAL /HPF
BASOPHILS # BLD AUTO: 0.03 10*3/MM3 (ref 0–0.2)
BASOPHILS NFR BLD AUTO: 0.4 % (ref 0–1.5)
BILIRUB SERPL-MCNC: 0.3 MG/DL (ref 0–1.2)
BILIRUB UR QL STRIP: NEGATIVE
BUN SERPL-MCNC: 10 MG/DL (ref 6–20)
BUN/CREAT SERPL: 12.7 (ref 7–25)
CALCIUM SPEC-SCNC: 9.4 MG/DL (ref 8.6–10.5)
CHLORIDE SERPL-SCNC: 105 MMOL/L (ref 98–107)
CLARITY UR: CLEAR
CO2 SERPL-SCNC: 26.2 MMOL/L (ref 22–29)
COLOR UR: YELLOW
CREAT SERPL-MCNC: 0.79 MG/DL (ref 0.57–1)
DEPRECATED RDW RBC AUTO: 40.2 FL (ref 37–54)
EOSINOPHIL # BLD AUTO: 0.03 10*3/MM3 (ref 0–0.4)
EOSINOPHIL NFR BLD AUTO: 0.4 % (ref 0.3–6.2)
ERYTHROCYTE [DISTWIDTH] IN BLOOD BY AUTOMATED COUNT: 12.6 % (ref 12.3–15.4)
GFR SERPL CREATININE-BSD FRML MDRD: 75 ML/MIN/1.73
GLOBULIN UR ELPH-MCNC: 2.7 GM/DL
GLUCOSE SERPL-MCNC: 120 MG/DL (ref 65–99)
GLUCOSE UR STRIP-MCNC: NEGATIVE MG/DL
HCT VFR BLD AUTO: 35.9 % (ref 34–46.6)
HGB BLD-MCNC: 12 G/DL (ref 12–15.9)
HGB UR QL STRIP.AUTO: NEGATIVE
HYALINE CASTS UR QL AUTO: NORMAL /LPF
IMM GRANULOCYTES # BLD AUTO: 0.01 10*3/MM3 (ref 0–0.05)
IMM GRANULOCYTES NFR BLD AUTO: 0.1 % (ref 0–0.5)
KETONES UR QL STRIP: NEGATIVE
LEUKOCYTE ESTERASE UR QL STRIP.AUTO: NEGATIVE
LYMPHOCYTES # BLD AUTO: 1.41 10*3/MM3 (ref 0.7–3.1)
LYMPHOCYTES NFR BLD AUTO: 18.8 % (ref 19.6–45.3)
MCH RBC QN AUTO: 29.4 PG (ref 26.6–33)
MCHC RBC AUTO-ENTMCNC: 33.4 G/DL (ref 31.5–35.7)
MCV RBC AUTO: 88 FL (ref 79–97)
MONOCYTES # BLD AUTO: 0.47 10*3/MM3 (ref 0.1–0.9)
MONOCYTES NFR BLD AUTO: 6.3 % (ref 5–12)
NEUTROPHILS NFR BLD AUTO: 5.55 10*3/MM3 (ref 1.7–7)
NEUTROPHILS NFR BLD AUTO: 74 % (ref 42.7–76)
NITRITE UR QL STRIP: NEGATIVE
NRBC BLD AUTO-RTO: 0 /100 WBC (ref 0–0.2)
PH UR STRIP.AUTO: 7 [PH] (ref 5–8)
PLATELET # BLD AUTO: 220 10*3/MM3 (ref 140–450)
PMV BLD AUTO: 9.6 FL (ref 6–12)
POTASSIUM SERPL-SCNC: 3.9 MMOL/L (ref 3.5–5.2)
PROT SERPL-MCNC: 6.9 G/DL (ref 6–8.5)
PROT UR QL STRIP: NEGATIVE
RBC # BLD AUTO: 4.08 10*6/MM3 (ref 3.77–5.28)
RBC # UR: NORMAL /HPF
REF LAB TEST METHOD: NORMAL
SODIUM SERPL-SCNC: 141 MMOL/L (ref 136–145)
SP GR UR STRIP: 1.01 (ref 1–1.03)
SQUAMOUS #/AREA URNS HPF: NORMAL /HPF
UROBILINOGEN UR QL STRIP: NORMAL
WBC # BLD AUTO: 7.5 10*3/MM3 (ref 3.4–10.8)
WBC UR QL AUTO: NORMAL /HPF

## 2020-07-17 PROCEDURE — 81001 URINALYSIS AUTO W/SCOPE: CPT | Performed by: FAMILY MEDICINE

## 2020-07-17 PROCEDURE — 36415 COLL VENOUS BLD VENIPUNCTURE: CPT

## 2020-07-17 PROCEDURE — 87086 URINE CULTURE/COLONY COUNT: CPT | Performed by: FAMILY MEDICINE

## 2020-07-17 PROCEDURE — 80053 COMPREHEN METABOLIC PANEL: CPT

## 2020-07-17 PROCEDURE — 85025 COMPLETE CBC W/AUTO DIFF WBC: CPT

## 2020-07-17 NOTE — TELEPHONE ENCOUNTER
Spoke to pt regarding what questions she had and informed her of what Dr. Mccray stated. She expressed understanding.

## 2020-07-17 NOTE — TELEPHONE ENCOUNTER
PATIENT SAW DR HERNANDEZ TODAY, AND HAD SOME QUESTIONS FOR HER.  IF SHE COULD CALL BACK WHEN SHE HAS A CHANCE.    [phone]

## 2020-07-18 LAB — BACTERIA SPEC AEROBE CULT: NO GROWTH

## 2020-07-21 ENCOUNTER — TELEPHONE (OUTPATIENT)
Dept: FAMILY MEDICINE CLINIC | Facility: CLINIC | Age: 55
End: 2020-07-21

## 2020-07-21 NOTE — TELEPHONE ENCOUNTER
PATIENT STATE: wants a call back for labs on 7/17/2020 please advise     PATIENT CAN BE REACHED ON:[phone]

## 2020-07-21 NOTE — TELEPHONE ENCOUNTER
Message Summary    Patient called to inform us of abdominal cramping and blood in stool.   Please  return call and advise.    Best Callback Number: [phone]    Patient Notes: n/a

## 2020-07-22 ENCOUNTER — TELEMEDICINE (OUTPATIENT)
Dept: FAMILY MEDICINE CLINIC | Facility: CLINIC | Age: 55
End: 2020-07-22

## 2020-07-22 DIAGNOSIS — R19.7 DIARRHEA, UNSPECIFIED TYPE: ICD-10-CM

## 2020-07-22 DIAGNOSIS — K62.5 BRBPR (BRIGHT RED BLOOD PER RECTUM): ICD-10-CM

## 2020-07-22 DIAGNOSIS — R10.9 ABDOMINAL CRAMPING: Primary | ICD-10-CM

## 2020-07-22 PROCEDURE — 99213 OFFICE O/P EST LOW 20 MIN: CPT | Performed by: FAMILY MEDICINE

## 2020-07-22 NOTE — PROGRESS NOTES
Subjective   Keke Stewart is a 55 y.o. female.     Chief Complaint   Patient presents with   • Rectal Bleeding   • Diarrhea       History of Present Illness   She complains of lower abdominal pain x 1 week. She states her stool is kind of like diarrhea with mucus. She states the stool ranges from loose to watery- she had 9-10 BM's yesterday in a span of two hours. She has not passed BM yet today, and the pain has improved, however she ate an english muffin and started to have cramping again and passed gas.   Yesterday was the only day she passed 9-10 BM's. Friday she had about three BM's that were loose, unformed, brown in color.   She noticed BRBPR yesterday on the toilet paper after she wiped and on part of her stool a few times. She complains of feeling rectal pressure. Denies rectal pain with passing stool.   Gas-X and Alleve (one to two alleves per day) seems to help relieve her symptoms.  This has happened before- in march or May of this year- lasted about 1 week or so.    No recent antibiotic use. Denies fevers, denies nausea/vomiting. Appetite is normal. Denies unexpected weight loss. Denies night sweats. Energy level is normal.  Last normal BM was about one week ago.     She had negative cologard in 2018.   She has no history of abdominal surgeries.      Past Medical History:   Diagnosis Date   • Headache      No past surgical history on file.  Social History     Tobacco Use   • Smoking status: Never Smoker   • Smokeless tobacco: Never Used   Substance Use Topics   • Alcohol use: No   • Drug use: Not on file     Family History   Problem Relation Age of Onset   • Cancer Mother    • Cancer Maternal Aunt        Review of Systems   Constitutional: Negative for activity change, appetite change, chills, diaphoresis, fatigue, fever, unexpected weight gain and unexpected weight loss.   Respiratory: Negative for chest tightness.    Cardiovascular: Negative for chest pain.   Gastrointestinal: Positive for  abdominal pain, blood in stool and diarrhea. Negative for abdominal distention, constipation, nausea, rectal pain and vomiting.   Genitourinary: Negative for dysuria.       Objective   LMP  (LMP Unknown)     Physical Exam   Constitutional: She appears well-developed and well-nourished. No distress.   Pleasant, anxious female, appears well.   HENT:   Head: Normocephalic and atraumatic.   Eyes: Conjunctivae are normal.   Pulmonary/Chest: Effort normal. No respiratory distress.   Neurological: She is alert.   Psychiatric: She has a normal mood and affect.   Vitals reviewed.      Procedures    Assessment/Plan   Keke was seen today for rectal bleeding and diarrhea.    Diagnoses and all orders for this visit:    Abdominal cramping  -     Ambulatory Referral to Gastroenterology    Diarrhea, unspecified type  -     Ambulatory Referral to Gastroenterology    BRBPR (bright red blood per rectum)  -     Ambulatory Referral to Gastroenterology        Will place referral for GI consult- I believe patient will benefit from colonoscopy. I do not believe she has any signs of infectious gastroenteritis/colitis at this time.  She is agreeable to this plan.            Patient Instructions    Referral placed for gastroenterology consult- office will call to schedule.

## 2020-07-22 NOTE — PATIENT INSTRUCTIONS
Patient Instructions    Referral placed for gastroenterology consult- office will call to schedule.

## 2020-07-22 NOTE — TELEPHONE ENCOUNTER
Spoke to pt yesterday regarding abdominal cramping and moved her appt to today at 1:00. See previous encounter.

## 2020-07-23 ENCOUNTER — TELEPHONE (OUTPATIENT)
Dept: FAMILY MEDICINE CLINIC | Facility: CLINIC | Age: 55
End: 2020-07-23

## 2020-07-23 NOTE — TELEPHONE ENCOUNTER
PATIENT IS CALLING REGARDS TO HER DAUGHTER TAKING A  COVID TEST, PATIENTS DAUGHTER CAME BACK NEGATIVE.    PATIENT HAS NOT RECEIVED RESULTS YET AND IS WANTING TO KNOW IF SHE SHOULD STILL QUARTERTINE. PATIENT IS HAVING NO SYMPTOMS AND ONLY GOT TESTED BECAUSE OF HER DAUGHTER    PLEASE CALL AND ADVISE  [phone]

## 2020-07-27 ENCOUNTER — TELEPHONE (OUTPATIENT)
Dept: GASTROENTEROLOGY | Facility: CLINIC | Age: 55
End: 2020-07-27

## 2020-07-27 NOTE — TELEPHONE ENCOUNTER
Pt called and states that he abdominal pain is getting worse and very sharp. She has been taking Aleve for the pain and is worried about taking this for so long, She is having Constipation mostly - but irregular bowel movements. She is scheduled with Dr. 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 She is wanting to see about getting in sooner. Please Advise,

## 2020-07-29 ENCOUNTER — OFFICE VISIT (OUTPATIENT)
Dept: GASTROENTEROLOGY | Facility: CLINIC | Age: 55
End: 2020-07-29

## 2020-07-29 ENCOUNTER — PREP FOR SURGERY (OUTPATIENT)
Dept: OTHER | Facility: HOSPITAL | Age: 55
End: 2020-07-29

## 2020-07-29 VITALS
HEIGHT: 63 IN | OXYGEN SATURATION: 99 % | SYSTOLIC BLOOD PRESSURE: 128 MMHG | TEMPERATURE: 97.4 F | DIASTOLIC BLOOD PRESSURE: 86 MMHG | WEIGHT: 197.6 LBS | BODY MASS INDEX: 35.01 KG/M2 | HEART RATE: 95 BPM

## 2020-07-29 DIAGNOSIS — R19.4 CHANGE IN BOWEL HABITS: ICD-10-CM

## 2020-07-29 DIAGNOSIS — R10.30 LOWER ABDOMINAL PAIN: Primary | ICD-10-CM

## 2020-07-29 DIAGNOSIS — K21.9 GASTROESOPHAGEAL REFLUX DISEASE, ESOPHAGITIS PRESENCE NOT SPECIFIED: ICD-10-CM

## 2020-07-29 DIAGNOSIS — K59.00 CONSTIPATION, UNSPECIFIED CONSTIPATION TYPE: ICD-10-CM

## 2020-07-29 PROCEDURE — 99204 OFFICE O/P NEW MOD 45 MIN: CPT | Performed by: NURSE PRACTITIONER

## 2020-07-29 RX ORDER — PHENOL 1.4 %
AEROSOL, SPRAY (ML) MUCOUS MEMBRANE DAILY
COMMUNITY

## 2020-07-29 RX ORDER — OMEPRAZOLE 40 MG/1
40 CAPSULE, DELAYED RELEASE ORAL DAILY
Qty: 30 CAPSULE | Refills: 5 | Status: SHIPPED | OUTPATIENT
Start: 2020-07-29 | End: 2021-01-18 | Stop reason: SDUPTHER

## 2020-07-29 RX ORDER — DICYCLOMINE HYDROCHLORIDE 10 MG/1
10 CAPSULE ORAL 3 TIMES DAILY PRN
Qty: 90 CAPSULE | Refills: 5 | Status: SHIPPED | OUTPATIENT
Start: 2020-07-29 | End: 2022-07-15

## 2020-07-29 NOTE — PATIENT INSTRUCTIONS
Schedule EGD for evaluation of worsening GERD.    Schedule colonoscopy for evaluation of lower abdominal pain and change in bowel habits.    For constipation, recommend starting MiraLAX daily available over-the-counter.  Mix 1 capful in 8 ounces of noncarbonated liquid and drink once daily.    For abdominal cramping, begin trial of dicyclomine as needed. Prescription sent to pharmacy.    For GERD, we will increase omeprazole to 40mg daily.    Follow-up after testing complete.  Call for any new or worsening symptoms.

## 2020-07-29 NOTE — PROGRESS NOTES
Chief Complaint   Patient presents with   • Abdominal Pain   • Heartburn   • Constipation       HPI  Patient is a 55-year-old female who presents today for evaluation.    Patient presents today with concerns about GERD, abdominal pain, and change in bowel habits.    Patient presents today with concerns about GERD.  Symptoms have been present over the last 2 years.  Over the last few months the symptoms are worsening.  She has been having increased belching and dyspepsia.  She denies any nausea, vomiting or dysphagia.  She reports occasional epigastric discomfort described as a stomachache.  She has been taking omeprazole 20 mg daily however is having breakthrough symptoms despite use of this.    She also presents with concerns about lower abdominal pain.  This began 3 to 4 months ago.  It initially improved but then it recurred around 2 months ago and has been somewhat persistent though it is intermittent.  She reports the pain is located to the lower abdomen and described as a cramping.  Other times it feels like a pressure.  Over this interval she has also noted that she is more constipated and has increased gas.  Her stool appears smaller than normal when she does have a bowel movement.  She will generally have a bowel movement every 2 to 3 days but then on other days she will have up to 9-10 bowel movements per day.  She has noted mucus in her stools at times.  She reports she had one episode of rectal bleeding described as being bright red and present with wiping, she believes this was secondary to irritation as she has had around 10 bowel movements per day on that day.  She had taken Aleve at times to help with the pain and had used a heating pad which also helped.    She denies any family history of colon cancer or polyps.  She completed a Cologuard for colon cancer screening in 2018 that was negative.  She has never had an EGD or colonoscopy.  Denies any family history of proctitis or colitis.  Her father  had a hiatal hernia.    Review of Systems   Constitutional: Negative for appetite change, chills, diaphoresis, fatigue, fever and unexpected weight change.   HENT: Negative for dental problem, ear pain, mouth sores, rhinorrhea, sore throat and voice change.    Eyes: Negative for pain, redness and visual disturbance.   Respiratory: Negative for cough, chest tightness and wheezing.    Cardiovascular: Negative for chest pain, palpitations and leg swelling.   Endocrine: Negative for cold intolerance, heat intolerance, polydipsia, polyphagia and polyuria.   Genitourinary: Negative for dysuria, frequency, hematuria and urgency.   Musculoskeletal: Negative for arthralgias, back pain, joint swelling, myalgias and neck pain.   Skin: Negative for rash.   Allergic/Immunologic: Positive for environmental allergies. Negative for food allergies and immunocompromised state.   Neurological: Negative for dizziness, seizures, weakness, numbness and headaches.   Hematological: Does not bruise/bleed easily.   Psychiatric/Behavioral: Negative for sleep disturbance. The patient is nervous/anxious.         Problem List:    Patient Active Problem List   Diagnosis   • Atopic rhinitis   • Migraine   • Prehypertension   • Class 1 obesity with body mass index (BMI) of 30.0 to 30.9 in adult   • Anxiety   • Seasonal allergies   • Gastroesophageal reflux disease       Medical History:    Past Medical History:   Diagnosis Date   • GERD (gastroesophageal reflux disease)    • Headache         Social History:    Social History     Socioeconomic History   • Marital status:      Spouse name: Not on file   • Number of children: Not on file   • Years of education: Not on file   • Highest education level: Not on file   Tobacco Use   • Smoking status: Never Smoker   • Smokeless tobacco: Never Used   Substance and Sexual Activity   • Alcohol use: No   • Drug use: Never   • Sexual activity: Defer       Family History:   Family History   Problem  Relation Age of Onset   • Cancer Mother    • Cancer Maternal Aunt    • Colon cancer Neg Hx    • Colon polyps Neg Hx        Surgical History:   Past Surgical History:   Procedure Laterality Date   • COLONOSCOPY  2018    cologuard   • UPPER GASTROINTESTINAL ENDOSCOPY  25 years ago    does not remember physician         Current Outpatient Medications:   •  chlorhexidine (PERIDEX) 0.12 % solution, See Admin Instructions., Disp: , Rfl: 1  •  clotrimazole (LOTRIMIN) 1 % cream, Apply to rash twice daily for 7 to 14 days or until resolution of rash., Disp: 28 g, Rfl: 0  •  Fexofenadine HCl (ALLEGRA ALLERGY PO), Take  by mouth Daily., Disp: , Rfl:   •  fluticasone (Flonase) 50 MCG/ACT nasal spray, 2 sprays each nostril 2 times a day prn, Disp: 4 bottle, Rfl: 5  •  montelukast (SINGULAIR) 10 MG tablet, Take 1 tablet by mouth every night at bedtime., Disp: 90 tablet, Rfl: 1  •  Multiple Vitamins-Minerals (MULTIVITAMIN ADULTS 50+) tablet, Take  by mouth Daily., Disp: , Rfl:   •  dicyclomine (BENTYL) 10 MG capsule, Take 1 capsule by mouth 3 (Three) Times a Day As Needed (abdominal cramping)., Disp: 90 capsule, Rfl: 5  •  omeprazole (priLOSEC) 40 MG capsule, Take 1 capsule by mouth Daily., Disp: 30 capsule, Rfl: 5    Allergies:  Codeine; Erythromycin; and Penicillins    The following portions of the patient's history were reviewed and updated as appropriate: allergies, current medications, past family history, past medical history, past social history, past surgical history and problem list.    Vitals:    07/29/20 0811   BP: 128/86   Pulse: 95   Temp: 97.4 °F (36.3 °C)   SpO2: 99%         07/29/20 0811   Weight: 89.6 kg (197 lb 9.6 oz)     Body mass index is 35 kg/m².    Physical Exam   Constitutional: She is oriented to person, place, and time. She appears well-developed and well-nourished. No distress.   HENT:   Head: Normocephalic and atraumatic.   Eyes: No scleral icterus.   Cardiovascular: Normal rate and regular rhythm.    Pulmonary/Chest: Effort normal and breath sounds normal. No respiratory distress.   Abdominal: Soft. Bowel sounds are normal. She exhibits no distension. There is no tenderness.   Neurological: She is alert and oriented to person, place, and time.   Skin: Skin is warm and dry.   Psychiatric: She has a normal mood and affect. Thought content normal.   Vitals reviewed.        Assessment/ Plan  Keke was seen today for abdominal pain, heartburn and constipation.    Diagnoses and all orders for this visit:    Lower abdominal pain    Change in bowel habits    Constipation, unspecified constipation type    Gastroesophageal reflux disease, esophagitis presence not specified    Other orders  -     omeprazole (priLOSEC) 40 MG capsule; Take 1 capsule by mouth Daily.  -     dicyclomine (BENTYL) 10 MG capsule; Take 1 capsule by mouth 3 (Three) Times a Day As Needed (abdominal cramping).         Return for Review results after testing complete.    Patient Instructions   Schedule EGD for evaluation of worsening GERD.    Schedule colonoscopy for evaluation of lower abdominal pain and change in bowel habits.    For constipation, recommend starting MiraLAX daily available over-the-counter.  Mix 1 capful in 8 ounces of noncarbonated liquid and drink once daily.    For abdominal cramping, begin trial of dicyclomine as needed. Prescription sent to pharmacy.    For GERD, we will increase omeprazole to 40mg daily.    Follow-up after testing complete.  Call for any new or worsening symptoms.       Discussion:  Due to worsening GERD, we will schedule EGD for further evaluation.  Also recommended she limit NSAID medications as this may be contributing to her symptoms.  We will also plan on a colonoscopy to evaluate change in bowel habits and lower abdominal pain.  Meanwhile, I recommended initiating treatment with MiraLAX as I suspect that more regular and complete bowel movements would lead to some symptomatic improvement, will  provide prescription for dicyclomine to be used as needed and recommended increasing to 40 mg daily of omeprazole due to her persistent symptoms of GERD.

## 2020-07-31 ENCOUNTER — TELEPHONE (OUTPATIENT)
Dept: GASTROENTEROLOGY | Facility: CLINIC | Age: 55
End: 2020-07-31

## 2020-07-31 NOTE — TELEPHONE ENCOUNTER
Patient called back today and states that she only wants to have the egd done and to cancel the colonoscopy.Shanon was aware that the patient was going to think about having both done and that she may decline to have them done. Patient states she sent a message through [employer] to Shanon today.

## 2020-08-03 ENCOUNTER — PREP FOR SURGERY (OUTPATIENT)
Dept: OTHER | Facility: HOSPITAL | Age: 55
End: 2020-08-03

## 2020-08-03 DIAGNOSIS — R19.4 CHANGE IN BOWEL HABITS: ICD-10-CM

## 2020-08-03 DIAGNOSIS — K21.9 GASTROESOPHAGEAL REFLUX DISEASE, ESOPHAGITIS PRESENCE NOT SPECIFIED: Primary | ICD-10-CM

## 2020-08-10 ENCOUNTER — LAB REQUISITION (OUTPATIENT)
Dept: LAB | Facility: HOSPITAL | Age: 55
End: 2020-08-10

## 2020-08-10 DIAGNOSIS — Z00.00 ENCOUNTER FOR GENERAL ADULT MEDICAL EXAMINATION WITHOUT ABNORMAL FINDINGS: ICD-10-CM

## 2020-08-10 PROCEDURE — U0004 COV-19 TEST NON-CDC HGH THRU: HCPCS | Performed by: INTERNAL MEDICINE

## 2020-08-11 LAB
REF LAB TEST METHOD: NORMAL
SARS-COV-2 RNA RESP QL NAA+PROBE: NOT DETECTED

## 2020-08-12 ENCOUNTER — LAB REQUISITION (OUTPATIENT)
Dept: LAB | Facility: HOSPITAL | Age: 55
End: 2020-08-12

## 2020-08-12 ENCOUNTER — OUTSIDE FACILITY SERVICE (OUTPATIENT)
Dept: GASTROENTEROLOGY | Facility: CLINIC | Age: 55
End: 2020-08-12

## 2020-08-12 DIAGNOSIS — K21.9 GASTRO-ESOPHAGEAL REFLUX DISEASE WITHOUT ESOPHAGITIS: ICD-10-CM

## 2020-08-12 PROCEDURE — 88305 TISSUE EXAM BY PATHOLOGIST: CPT | Performed by: INTERNAL MEDICINE

## 2020-08-12 PROCEDURE — 43239 EGD BIOPSY SINGLE/MULTIPLE: CPT | Performed by: INTERNAL MEDICINE

## 2020-08-13 LAB
CYTO UR: NORMAL
LAB AP CASE REPORT: NORMAL
LAB AP CLINICAL INFORMATION: NORMAL
PATH REPORT.FINAL DX SPEC: NORMAL
PATH REPORT.GROSS SPEC: NORMAL

## 2020-09-26 ENCOUNTER — FLU SHOT (OUTPATIENT)
Dept: FAMILY MEDICINE CLINIC | Facility: CLINIC | Age: 55
End: 2020-09-26

## 2020-09-26 DIAGNOSIS — Z23 NEED FOR INFLUENZA VACCINATION: ICD-10-CM

## 2020-09-26 PROCEDURE — 90471 IMMUNIZATION ADMIN: CPT | Performed by: FAMILY MEDICINE

## 2020-09-26 PROCEDURE — 90686 IIV4 VACC NO PRSV 0.5 ML IM: CPT | Performed by: FAMILY MEDICINE

## 2020-10-05 ENCOUNTER — OFFICE VISIT (OUTPATIENT)
Dept: GASTROENTEROLOGY | Facility: CLINIC | Age: 55
End: 2020-10-05

## 2020-10-05 VITALS
HEART RATE: 96 BPM | TEMPERATURE: 97.4 F | HEIGHT: 63 IN | DIASTOLIC BLOOD PRESSURE: 82 MMHG | OXYGEN SATURATION: 98 % | BODY MASS INDEX: 33.12 KG/M2 | WEIGHT: 186.9 LBS | SYSTOLIC BLOOD PRESSURE: 124 MMHG

## 2020-10-05 DIAGNOSIS — K21.9 GASTROESOPHAGEAL REFLUX DISEASE WITHOUT ESOPHAGITIS: Primary | ICD-10-CM

## 2020-10-05 DIAGNOSIS — K44.9 HIATAL HERNIA: ICD-10-CM

## 2020-10-05 PROCEDURE — 99213 OFFICE O/P EST LOW 20 MIN: CPT | Performed by: NURSE PRACTITIONER

## 2020-10-05 NOTE — PROGRESS NOTES
Chief Complaint   Patient presents with   • Follow-up     post EGD       HPI  Patient is a 55-year-old female who presents today for follow-up.  She was seen in the office July 2020 with concerns about GERD, abdominal pain, and change in bowel habits.  EGD and colonoscopy were recommended.  She was recommended to start MiraLAX for constipation, given a prescription for dicyclomine, and omeprazole was increased to 40 mg daily.    EGD August 2020 with a 3 cm hiatal hernia, otherwise normal endoscopically.  Small bowel biopsies were unremarkable, gastric biopsies negative for H. pylori.  Patient declined colonoscopy.    Patient presents today for follow-up.  She reports since increasing the dose of omeprazole her reflux symptoms have improved.  She overall is feeling much better.  She denies nausea or vomiting.    She has also noted resolution of abdominal pain.  Her bowel habits have returned to normal.  She has been using MiraLAX on an as-needed basis which has helped and denies any rectal bleeding.    Review of Systems   Constitutional: Negative for appetite change, chills, diaphoresis, fatigue, fever and unexpected weight change.   HENT: Negative for dental problem, ear pain, mouth sores, rhinorrhea, sore throat and voice change.    Eyes: Negative for pain, redness and visual disturbance.   Respiratory: Negative for cough, chest tightness and wheezing.    Cardiovascular: Negative for chest pain, palpitations and leg swelling.   Endocrine: Negative for cold intolerance, heat intolerance, polydipsia, polyphagia and polyuria.   Genitourinary: Negative for dysuria, frequency, hematuria and urgency.   Musculoskeletal: Positive for arthralgias. Negative for back pain, joint swelling, myalgias and neck pain.   Skin: Negative for rash.   Allergic/Immunologic: Positive for environmental allergies. Negative for food allergies and immunocompromised state.   Neurological: Negative for dizziness, seizures, weakness, numbness  and headaches.   Hematological: Does not bruise/bleed easily.   Psychiatric/Behavioral: Negative for sleep disturbance. The patient is not nervous/anxious.         Problem List:    Patient Active Problem List   Diagnosis   • Atopic rhinitis   • Migraine   • Prehypertension   • Class 1 obesity with body mass index (BMI) of 30.0 to 30.9 in adult   • Anxiety   • Seasonal allergies   • Gastroesophageal reflux disease       Medical History:    Past Medical History:   Diagnosis Date   • GERD (gastroesophageal reflux disease)    • Headache         Social History:    Social History     Socioeconomic History   • Marital status:      Spouse name: Not on file   • Number of children: Not on file   • Years of education: Not on file   • Highest education level: Not on file   Tobacco Use   • Smoking status: Never Smoker   • Smokeless tobacco: Never Used   Substance and Sexual Activity   • Alcohol use: No   • Drug use: Never   • Sexual activity: Defer       Family History:   Family History   Problem Relation Age of Onset   • Cancer Mother    • Cancer Maternal Aunt    • Colon cancer Neg Hx    • Colon polyps Neg Hx        Surgical History:   Past Surgical History:   Procedure Laterality Date   [facility]   • UPPER GASTROINTESTINAL ENDOSCOPY  2020    [facility]         Current Outpatient Medications:   •  chlorhexidine (PERIDEX) 0.12 % solution, See Admin Instructions., Disp: , Rfl: 1  •  clotrimazole (LOTRIMIN) 1 % cream, Apply to rash twice daily for 7 to 14 days or until resolution of rash., Disp: 28 g, Rfl: 0  •  dicyclomine (BENTYL) 10 MG capsule, Take 1 capsule by mouth 3 (Three) Times a Day As Needed (abdominal cramping)., Disp: 90 capsule, Rfl: 5  •  Fexofenadine HCl (ALLEGRA ALLERGY PO), Take  by mouth Daily., Disp: , Rfl:   •  fluticasone (Flonase) 50 MCG/ACT nasal spray, 2 sprays each nostril 2 times a day prn, Disp: 4 bottle, Rfl: 5  •  montelukast (SINGULAIR) 10 MG tablet, Take 1 tablet by mouth  every night at bedtime., Disp: 90 tablet, Rfl: 1  •  Multiple Vitamins-Minerals (MULTIVITAMIN ADULTS 50+) tablet, Take  by mouth Daily., Disp: , Rfl:   •  omeprazole (priLOSEC) 40 MG capsule, Take 1 capsule by mouth Daily., Disp: 30 capsule, Rfl: 5    Allergies:  Codeine, Erythromycin, and Penicillins    The following portions of the patient's history were reviewed and updated as appropriate: allergies, current medications, past family history, past medical history, past social history, past surgical history and problem list.    Vitals:    10/05/20 0812   BP: 124/82   Pulse: 96   Temp: 97.4 °F (36.3 °C)   SpO2: 98%         10/05/20  0812   Weight: 84.8 kg (186 lb 14.4 oz)     Body mass index is 33.11 kg/m².    Physical Exam  Vitals signs reviewed.   Constitutional:       General: She is not in acute distress.     Appearance: She is well-developed.   HENT:      Head: Normocephalic and atraumatic.   Pulmonary:      Effort: Pulmonary effort is normal. No respiratory distress.   Skin:     General: Skin is dry.      Coloration: Skin is not pale.   Neurological:      Mental Status: She is alert and oriented to person, place, and time.   Psychiatric:         Thought Content: Thought content normal.           Assessment/ Plan  Keke was seen today for follow-up.    Diagnoses and all orders for this visit:    Gastroesophageal reflux disease without esophagitis    Hiatal hernia         No follow-ups on file.    Patient Instructions   For GERD with hiatal hernia, continue omeprazole 40 mg daily as prescribed.    For GERD, follow antireflux precautions.  Recommend avoiding eating within 3 to 4 hours of bedtime.  Avoid foods that can trigger symptoms which may include citrus fruits, spicy foods, tomatoes and red sauces, chocolate, coffee/tea, caffeinated or carbonated beverages, alcohol.    For constipation, may continue to use MiraLAX as needed.    For abdominal pain, resolved, continue to monitor and please contact the  office if this recurs.    For colon cancer screening with negative Cologuard in 2018, will be due for next screening in 2021.    Follow-up yearly and as needed.  Call for any new or worsening symptoms.       Discussion:  Symptomatically, patient is doing well at this time.  We reviewed EGD findings at today's office visit and discussed presence of hiatal hernia including diet and lifestyle modifications to help with this.  As her pain and bowel changes have resolved, will continue to monitor the symptoms and she was instructed to contact the office if these recur.

## 2020-10-05 NOTE — PATIENT INSTRUCTIONS
For GERD with hiatal hernia, continue omeprazole 40 mg daily as prescribed.    For GERD, follow antireflux precautions.  Recommend avoiding eating within 3 to 4 hours of bedtime.  Avoid foods that can trigger symptoms which may include citrus fruits, spicy foods, tomatoes and red sauces, chocolate, coffee/tea, caffeinated or carbonated beverages, alcohol.    For constipation, may continue to use MiraLAX as needed.    For abdominal pain, resolved, continue to monitor and please contact the office if this recurs.    For colon cancer screening with negative Cologuard in 2018, will be due for next screening in 2021.    Follow-up yearly and as needed.  Call for any new or worsening symptoms.

## 2020-10-15 ENCOUNTER — OFFICE VISIT (OUTPATIENT)
Dept: FAMILY MEDICINE CLINIC | Facility: CLINIC | Age: 55
End: 2020-10-15

## 2020-10-15 ENCOUNTER — HOSPITAL ENCOUNTER (OUTPATIENT)
Dept: GENERAL RADIOLOGY | Facility: HOSPITAL | Age: 55
Discharge: HOME | End: 2020-10-15
Admitting: FAMILY MEDICINE

## 2020-10-15 VITALS
DIASTOLIC BLOOD PRESSURE: 80 MMHG | HEART RATE: 94 BPM | BODY MASS INDEX: 32.6 KG/M2 | WEIGHT: 184 LBS | SYSTOLIC BLOOD PRESSURE: 128 MMHG | OXYGEN SATURATION: 99 % | RESPIRATION RATE: 16 BRPM | TEMPERATURE: 97.4 F | HEIGHT: 63 IN

## 2020-10-15 DIAGNOSIS — M25.561 ACUTE PAIN OF BOTH KNEES: Primary | ICD-10-CM

## 2020-10-15 DIAGNOSIS — M25.562 ACUTE PAIN OF BOTH KNEES: Primary | ICD-10-CM

## 2020-10-15 DIAGNOSIS — M25.561 ACUTE PAIN OF BOTH KNEES: ICD-10-CM

## 2020-10-15 DIAGNOSIS — E66.9 CLASS 1 OBESITY WITHOUT SERIOUS COMORBIDITY WITH BODY MASS INDEX (BMI) OF 30.0 TO 30.9 IN ADULT, UNSPECIFIED OBESITY TYPE: ICD-10-CM

## 2020-10-15 DIAGNOSIS — M25.562 ACUTE PAIN OF BOTH KNEES: ICD-10-CM

## 2020-10-15 PROCEDURE — 99213 OFFICE O/P EST LOW 20 MIN: CPT | Performed by: FAMILY MEDICINE

## 2020-10-15 PROCEDURE — 73562 X-RAY EXAM OF KNEE 3: CPT

## 2020-10-15 NOTE — PROGRESS NOTES
Subjective   Keke Stewart is a 55 y.o. female.     Chief Complaint   Patient presents with   • Leg Pain     bilateral leg pain       History of Present Illness   She has been on her feet a lot more lately since August since school started back- she is a .   Her right medial knee started hurting over a month ago- was sore to the touch, then three weeks ago the lateral left knee started hurting.   No injury or inciting event that she can think of, except for returning to work. Then two weeks ago her shins and calfs started hurting intermittently- her calves will feel stiff and sore.  The pain will be worse if she sits for awhile and then gets up, and in the morning when she wakes up. The pain is reduced with activity. She states pain will reach 9-10 and sometimes will wake her from sleep.  She denies tingling and numbness.   No history of back injury/pain, hip injury/pain  She is taking alleve (one at night), and tylenol arthritis (one in the morning) which did help some but wears off after awhile.     Past Medical History:   Diagnosis Date   • GERD (gastroesophageal reflux disease)    • Headache      Past Surgical History:   Procedure Laterality Date   • COLONOSCOPY  2018    cologuard   • UPPER GASTROINTESTINAL ENDOSCOPY  2020    [facility]     Social History     Tobacco Use   • Smoking status: Never Smoker   • Smokeless tobacco: Never Used   Substance Use Topics   • Alcohol use: No   • Drug use: Never     Family History   Problem Relation Age of Onset   • Cancer Mother    • Cancer Maternal Aunt    • Colon cancer Neg Hx    • Colon polyps Neg Hx        Review of Systems   Constitutional: Positive for activity change (increased- on her feet for work since August.). Negative for appetite change, fatigue, fever, unexpected weight gain and unexpected weight loss.   Respiratory: Negative for cough, chest tightness and shortness of breath.    Cardiovascular: Negative for chest pain and leg swelling.  "  Gastrointestinal: Negative for abdominal pain, constipation, diarrhea, nausea and vomiting.   Musculoskeletal: Positive for arthralgias and myalgias. Negative for back pain, gait problem, joint swelling, neck pain and neck stiffness.   Skin: Negative for rash.       Objective   /80   Pulse 94   Temp 97.4 °F (36.3 °C)   Resp 16   Ht 160 cm (63\")   Wt 83.5 kg (184 lb)   LMP  (LMP Unknown)   SpO2 99%   BMI 32.59 kg/m²     Physical Exam  Vitals signs reviewed.   Constitutional:       General: She is not in acute distress.     Appearance: She is well-developed.   HENT:      Head: Normocephalic and atraumatic.   Eyes:      Conjunctiva/sclera: Conjunctivae normal.   Cardiovascular:      Rate and Rhythm: Normal rate and regular rhythm.      Heart sounds: Normal heart sounds.   Pulmonary:      Effort: Pulmonary effort is normal. No respiratory distress.      Breath sounds: Normal breath sounds.   Musculoskeletal:      Comments: Bilateral knees: Symmetrical in appearance. Skin is clean, dry, and intact. No erythema, edema, or ecchymosis. No obvious deformity. ROM intact. No effusion palpable. Patellar grind negative. Varus/Valgus stress test positive- pain elicited with palpation of medial and lateral joint lines bilaterally.   Arches of feet are neutral.  No leg length discrepancy.  Gait appears normal.  Calves: appear normal. Non-tender to palpation.    Skin:     General: Skin is warm and dry.   Neurological:      Mental Status: She is alert.   Psychiatric:         Mood and Affect: Mood is anxious. Affect is tearful.         Procedures    Assessment/Plan   Diagnoses and all orders for this visit:    1. Acute pain of both knees (Primary)  -     XR Knee 3 View Bilateral; Future  -     Ambulatory Referral to Physical Therapy Evaluate and treat; Stretching, ROM, Strengthening    2. Class 1 obesity without serious comorbidity with body mass index (BMI) of 30.0 to 30.9 in adult, unspecified obesity " type      Keke is very worried about her knee pain. I suspect her pain is due to osteoarthritis.   For now, instructed to takeTylenol Arthritis- two tablets up to every 8 hours daily and Alleve twice daily prn pain.  Knee X-rays ordered today.  PT referral placed- Assistance appreciated.  Recommended regular exercise with goal of weight loss and daily stretching exercises- but to avoid activities that aggravate the pain.  Recommended Shingrix vaccination- series of two- can get at your pharmacy.  Will plan to see her in 4 weeks for follow up.        Patient Instructions    Recommend taking Tylenol Arthritis- two tablets up to every 8 hours daily.  Alleve can also be taken twice a day as needed.  Knee X-rays ordered- will contact you with results.  PT referral placed- office will call you to schedule.  Avoid activities that aggravate the pain.  Recommend regular exercise with goal of weight loss and daily stretching exercises.  Recommend Shingrix vaccination- series of two- can get at your pharmacy.  Return to clinic in 4 weeks for follow up.

## 2020-10-15 NOTE — PATIENT INSTRUCTIONS
Patient Instructions    Recommend taking Tylenol Arthritis- two tablets up to every 8 hours daily.  Alleve can also be taken twice a day as needed.  Knee X-rays ordered- will contact you with results.  PT referral placed- office will call you to schedule.  Avoid activities that aggravate the pain.  Recommend regular exercise with goal of weight loss and daily stretching exercises.  Recommend Shingrix vaccination- series of two- can get at your pharmacy.  Return to clinic in 4 weeks for follow up.

## 2020-10-19 ENCOUNTER — TELEPHONE (OUTPATIENT)
Dept: FAMILY MEDICINE CLINIC | Facility: CLINIC | Age: 55
End: 2020-10-19

## 2020-10-19 NOTE — TELEPHONE ENCOUNTER
PATIENT WOULD LIKE TO HAVE A ORDER SENT TO PT SO THEY CAN WORK ON BOTH HANDS, BOTH THUMBS AND BOTH WRIST     PLEASE ADVISE     [phone]

## 2020-10-19 NOTE — TELEPHONE ENCOUNTER
Patient calling and states she is having bilateral thumb pain. She states it hurts to try and hold anything or apply any kind of pressure. She does state she went [place] a gun range over the weekend which may have caused it to flare up even more. Would like advice on what to do.    Please call patient at [phone].

## 2020-10-20 ENCOUNTER — TREATMENT (OUTPATIENT)
Dept: PHYSICAL THERAPY | Facility: CLINIC | Age: 55
End: 2020-10-20

## 2020-10-20 DIAGNOSIS — M25.562 ACUTE PAIN OF BOTH KNEES: Primary | ICD-10-CM

## 2020-10-20 DIAGNOSIS — M79.644 BILATERAL THUMB PAIN: Primary | ICD-10-CM

## 2020-10-20 DIAGNOSIS — M25.561 ACUTE PAIN OF BOTH KNEES: Primary | ICD-10-CM

## 2020-10-20 DIAGNOSIS — M79.645 BILATERAL THUMB PAIN: Primary | ICD-10-CM

## 2020-10-20 PROCEDURE — 97161 PT EVAL LOW COMPLEX 20 MIN: CPT | Performed by: PHYSICAL THERAPIST

## 2020-10-20 PROCEDURE — 97110 THERAPEUTIC EXERCISES: CPT | Performed by: PHYSICAL THERAPIST

## 2020-10-20 PROCEDURE — 97112 NEUROMUSCULAR REEDUCATION: CPT | Performed by: PHYSICAL THERAPIST

## 2020-10-20 NOTE — TELEPHONE ENCOUNTER
PATIENT CALLED TO REQUEST A REFERRAL TO PHYSICAL THERAPY FOR HER THUMB PAIN, [facility] [address]    PATIENT CALL BACK NUMBER, PLEASE ADVISE: [phone]

## 2020-10-20 NOTE — PROGRESS NOTES
Physical Therapy Initial Evaluation and Plan of Care      Patient: Keke Stewart   : 1965  Diagnosis/ICD-10 Code:  Acute pain of both knees [M25.561, M25.562]  Referring practitioner: Mitzi Mccray MD  Date of Initial Visit: 10/20/2020  Today's Date: 10/21/2020  Patient seen for 1 sessions           Subjective Evaluation    History of Present Illness  Date of onset: 2020  Mechanism of injury: Started about 3-4 weeks ago on RIGHT in area of pes and then progressed to LEFT lateral knee pain  Knees were POPPING then started to go down into the calf pain/stiffness and now down into both ankles stiffness/tender  Started back to school in AUG standing more than ever maybe with the wrong shoes ( wearing fit flops)    MD thinks arthritis   NOT SLEEPING well waking at 3am with stiffness everywhere   DAILY standing assistant in the [employer]  ON FEET 90% of the day  UP STAIRs OK DOWN a little harder   Was walking 1-2 miles on pavement with hills    Subjective comment: both knees hurt and now spreading into ankles  Patient Occupation: teacher assisant  Quality of life: good    Pain  Current pain ratin  At best pain ratin  At worst pain ratin  Location: knees and ankles/ calfs alternate R vs L   Quality: throbbing and dull ache (very tender to touch in calf/ankle)  Relieving factors: rest, ice, change in position and medications  Aggravating factors: prolonged positioning, standing, movement and repetitive movement  Progression: no change    Social Support  Lives in: one-story house    Diagnostic Tests  X-ray: normal (very minimal OA laterally B )    Treatments  Previous treatment: physical therapy  Patient Goals  Patient goals for therapy: decreased pain, increased motion, increased strength and return to sport/leisure activities             Objective          Static Posture     Comments  POSTURE  Crests level  Scapular level  ROM  RIGHT  0-125  LEFT  0-130  MMT  Quad  5/5  Anterior tib   5/5  gastroc  5/5  sidelying hip ABD 4/5  bilaterally   Single limb stance   Balance WNL but noted hip hiking for stabilization   SLR 80 degrees bilaterally without symptom   KEYONNA  WNL RIGHT stiff last 10%  LEFT stiff last 25%   PALPATION  LEFT knee lateral joint line mild soreness  bilaterally distal calf muscle soreness           1- WALK  10-20 min a medium speed on treadmill   2.5-3.2  2- BREAK  UP YOUR DAY  a. Every 30-60 min  sit 10 min  b. LUNCH and after work lay down 10-15 min  i. EXERCISE  ii. Ice 10 min  3- LAYING DOWN EXERCISES  a. BUTTERFLY stretch bottoms of feet together  1-4 min  b. STRAIGHT LEG RAISE  bend 1 knee  straight the other and lift up 2 3 down don’t rest   start 10x   GOAL  2 sets of 15  c. SIDE LYING STRAIGHT LEG RAISE  same as above   4- SITTING EXERCISES  a. PRETZEL STRETCH cross leg gentleman style  i. Sit up tall   lean forward with belly button  ii. 20-30 sec  2 x  each leg  b. HAMSTRING STRETCH   i. Sit on the edge of the chair with leg out straight  ii. Sit up tall and lean forward with belly button  iii. 20-30 sec  2x each leg  5- STANDING EXERCISES  a. Hang heels off the edge of a step  20 sec  2x  b. “runners stretch”  with a bent knee   10-20 sec  2x   POSTURE ed for up tall with core ON and equal weight on both feet.      BODY mechanics training for squat technique and how to modify at work when working with a child      Functional outcome score: KOS ADL score 31% suggesting 69% dysfunction        Assessment & Plan     Assessment  Impairments: abnormal or restricted ROM, activity intolerance, impaired physical strength, lacks appropriate home exercise program and pain with function  Assessment details: Keke Stewart is a 55 y.o. year-old female referred to physical therapy for bilaterally knee pain. She presents with a stable clinical presentation.  She has comorbidities mild joint OA and personal factors standing job that may affect her progress in the plan of care.   Signs and symptoms are consistent with physical therapy diagnosis of bilaterally knee pain exacerbation of inflammation then compensated activity affecting ankle pain.   Prognosis: good  Functional Limitations: walking, uncomfortable because of pain, standing and unable to perform repetitive tasks  Goals  Plan Goals: STG: to be met by 6 weeks  1- Patient will report pain < 1 /10 with light household activities  2-Patient will increase KEYONNA  To  WNL bilaterally without exacerbation   3-Patient will be independent with HEP without compensation or exacerbation   LTG: to be met by 12 weeks  1-Pt will report pain <1  /10 with recreational activities   2-Pt will improve single limb stance stabilization without compensation or exacerbation   3-Patient will increase strength side lying hip ABD from 4/5  to  5/5   4-Pt will be independent with comprehsive HEP     Plan  Therapy options: will be seen for skilled physical therapy services  Planned therapy interventions: transfer training, therapeutic activities, stretching, strengthening, postural training, neuromuscular re-education, home exercise program, gait training, body mechanics training and manual therapy  Other planned therapy interventions: Aquatic therapy  Frequency: 2x week  Duration in weeks: 12  Plan details: DURATION in visits 36        Timed:  Manual Therapy:    0     mins  05611;  Therapeutic Exercise:    15     mins  25957;     Neuromuscular Chirag:    10    mins  59636;    Therapeutic Activity:     0     mins  51807;     Gait Trainin     mins  81546;     Ultrasound:     0     mins  18978;    Electrical Stimulation:    0     mins  33416 ( );  Iontophoresis    0     mins 69386  Dry Needling   0     mins      Untimed:  Electrical Stimulation:    0     mins  40012 ( );  Mechanical Traction:    0     mins  93758;     Timed Treatment:   25   mins   Total Treatment:     45   mins    PT SIGNATURE: Ingrid Lawton, PT   DATE TREATMENT INITIATED:  10/21/2020    Initial Certification  Certification Period: 1/19/2021  I certify that the therapy services are furnished while this patient is under my care.  The services outlined above are required by this patient, and will be reviewed every 90 days.     PHYSICIAN: Mitzi Mccray MD      DATE:     Please sign and return via fax to [phone] Thank you, [facility].

## 2020-10-22 NOTE — TELEPHONE ENCOUNTER
Spoke to patient and gave her the phone number to [facility]. She is going to call them to see if they could get her in sooner.

## 2020-10-22 NOTE — TELEPHONE ENCOUNTER
PATIENT CALLED AND STATED THAT THE HAND SPECIALIST GROUP [facility]CROSS [facility] FROM DR. HERNANDEZ'S OFFICE COULD NOT GET IN TO BE SEEN UNTIL 11/9/20. SHE WOULD LIKE A CALLBACK TO DISCUSS BEING REFERRED TO SOMEONE ELSE THAT COULD GET HER IN SOONER DUE TO THE PAIN.    PATIENT CALLBACK: [phone]

## 2020-10-23 ENCOUNTER — TELEPHONE (OUTPATIENT)
Dept: PHYSICAL THERAPY | Facility: CLINIC | Age: 55
End: 2020-10-23

## 2020-10-23 ENCOUNTER — TELEPHONE (OUTPATIENT)
Dept: FAMILY MEDICINE CLINIC | Facility: CLINIC | Age: 55
End: 2020-10-23

## 2020-10-23 NOTE — TELEPHONE ENCOUNTER
Ms. Stewart called to reports that she was awoken last night with pain in her knee.  She was concerned about exercises.  I recommend she ice and stop performing butterfly, calf and pretzel stretches and hold all exercises until pain reduces.  She was instructed to continue ice.  She was scheduled for follow-up appointment next week.

## 2020-10-23 NOTE — TELEPHONE ENCOUNTER
PT HAS BEEN GOING TO PHYSICAL THERAPY FOR A FEW DAYS, AND HER LEGS ARE CONSTANTLY IN PAIN. SHE WANTS TO KNOW IF SHE CAN GET A REFERRAL TO SOMEWHERE ELSE. PT IS JUST HURTING HER TOO MUCH.    CALL BACK [phone]

## 2020-10-23 NOTE — TELEPHONE ENCOUNTER
She doesn't need to place another referral correct? Just let me know when you get a chance.  Thank you!

## 2020-10-27 ENCOUNTER — TELEPHONE (OUTPATIENT)
Dept: PHYSICAL THERAPY | Facility: CLINIC | Age: 55
End: 2020-10-27

## 2020-10-29 ENCOUNTER — TELEPHONE (OUTPATIENT)
Dept: PHYSICAL THERAPY | Facility: CLINIC | Age: 55
End: 2020-10-29

## 2020-10-29 NOTE — TELEPHONE ENCOUNTER
Do you have another suggestion in where she can go to PT at?  Just let me know and I will pass that information on to Inga for the referral

## 2020-10-29 NOTE — TELEPHONE ENCOUNTER
LVM for patient to call the office to let us know if she had a specific place she would like to go to for physical therapy    HUB- if patient calls back please ask her if she has a particular place she would like to go to for physical therapy and send us a message back.  Thank you

## 2020-11-05 ENCOUNTER — LAB (OUTPATIENT)
Dept: LAB | Facility: HOSPITAL | Age: 55
End: 2020-11-05

## 2020-11-05 ENCOUNTER — TRANSCRIBE ORDERS (OUTPATIENT)
Dept: ADMINISTRATIVE | Facility: HOSPITAL | Age: 55
End: 2020-11-05

## 2020-11-05 DIAGNOSIS — M25.50 POLYARTHRALGIA: ICD-10-CM

## 2020-11-05 DIAGNOSIS — M25.50 POLYARTHRALGIA: Primary | ICD-10-CM

## 2020-11-05 LAB
ALBUMIN SERPL-MCNC: 3.9 G/DL (ref 3.5–5.2)
ALBUMIN/GLOB SERPL: 1.1 G/DL
ALP SERPL-CCNC: 152 U/L (ref 39–117)
ALT SERPL W P-5'-P-CCNC: 27 U/L (ref 1–33)
ANION GAP SERPL CALCULATED.3IONS-SCNC: 11.6 MMOL/L (ref 5–15)
AST SERPL-CCNC: 22 U/L (ref 1–32)
BASOPHILS # BLD AUTO: 0.04 10*3/MM3 (ref 0–0.2)
BASOPHILS NFR BLD AUTO: 0.5 % (ref 0–1.5)
BILIRUB SERPL-MCNC: 0.2 MG/DL (ref 0–1.2)
BUN SERPL-MCNC: 15 MG/DL (ref 6–20)
BUN/CREAT SERPL: 20 (ref 7–25)
CALCIUM SPEC-SCNC: 9.5 MG/DL (ref 8.6–10.5)
CHLORIDE SERPL-SCNC: 100 MMOL/L (ref 98–107)
CHROMATIN AB SERPL-ACNC: 12 IU/ML (ref 0–14)
CO2 SERPL-SCNC: 27.4 MMOL/L (ref 22–29)
CREAT SERPL-MCNC: 0.75 MG/DL (ref 0.57–1)
CRP SERPL-MCNC: 2.15 MG/DL (ref 0–0.5)
DEPRECATED RDW RBC AUTO: 41.9 FL (ref 37–54)
EOSINOPHIL # BLD AUTO: 0.08 10*3/MM3 (ref 0–0.4)
EOSINOPHIL NFR BLD AUTO: 1 % (ref 0.3–6.2)
ERYTHROCYTE [DISTWIDTH] IN BLOOD BY AUTOMATED COUNT: 13.3 % (ref 12.3–15.4)
ERYTHROCYTE [SEDIMENTATION RATE] IN BLOOD: 34 MM/HR (ref 0–30)
GFR SERPL CREATININE-BSD FRML MDRD: 80 ML/MIN/1.73
GLOBULIN UR ELPH-MCNC: 3.4 GM/DL
GLUCOSE SERPL-MCNC: 91 MG/DL (ref 65–99)
HCT VFR BLD AUTO: 34.4 % (ref 34–46.6)
HGB BLD-MCNC: 11.1 G/DL (ref 12–15.9)
IMM GRANULOCYTES # BLD AUTO: 0.02 10*3/MM3 (ref 0–0.05)
IMM GRANULOCYTES NFR BLD AUTO: 0.3 % (ref 0–0.5)
LYMPHOCYTES # BLD AUTO: 1.92 10*3/MM3 (ref 0.7–3.1)
LYMPHOCYTES NFR BLD AUTO: 24.8 % (ref 19.6–45.3)
MCH RBC QN AUTO: 27.8 PG (ref 26.6–33)
MCHC RBC AUTO-ENTMCNC: 32.3 G/DL (ref 31.5–35.7)
MCV RBC AUTO: 86.2 FL (ref 79–97)
MONOCYTES # BLD AUTO: 0.59 10*3/MM3 (ref 0.1–0.9)
MONOCYTES NFR BLD AUTO: 7.6 % (ref 5–12)
NEUTROPHILS NFR BLD AUTO: 5.1 10*3/MM3 (ref 1.7–7)
NEUTROPHILS NFR BLD AUTO: 65.8 % (ref 42.7–76)
NRBC BLD AUTO-RTO: 0 /100 WBC (ref 0–0.2)
PLATELET # BLD AUTO: 308 10*3/MM3 (ref 140–450)
PMV BLD AUTO: 9.1 FL (ref 6–12)
POTASSIUM SERPL-SCNC: 3.9 MMOL/L (ref 3.5–5.2)
PROT SERPL-MCNC: 7.3 G/DL (ref 6–8.5)
RBC # BLD AUTO: 3.99 10*6/MM3 (ref 3.77–5.28)
SODIUM SERPL-SCNC: 139 MMOL/L (ref 136–145)
WBC # BLD AUTO: 7.75 10*3/MM3 (ref 3.4–10.8)

## 2020-11-05 PROCEDURE — 85652 RBC SED RATE AUTOMATED: CPT | Performed by: ORTHOPAEDIC SURGERY

## 2020-11-05 PROCEDURE — 86431 RHEUMATOID FACTOR QUANT: CPT | Performed by: ORTHOPAEDIC SURGERY

## 2020-11-05 PROCEDURE — 36415 COLL VENOUS BLD VENIPUNCTURE: CPT

## 2020-11-05 PROCEDURE — 85025 COMPLETE CBC W/AUTO DIFF WBC: CPT

## 2020-11-05 PROCEDURE — 80053 COMPREHEN METABOLIC PANEL: CPT

## 2020-11-05 PROCEDURE — 86140 C-REACTIVE PROTEIN: CPT | Performed by: ORTHOPAEDIC SURGERY

## 2020-11-09 ENCOUNTER — TELEPHONE (OUTPATIENT)
Dept: FAMILY MEDICINE CLINIC | Facility: CLINIC | Age: 55
End: 2020-11-09

## 2020-11-09 NOTE — TELEPHONE ENCOUNTER
PATIENT CALLED STATING SHE HAD SOME BLOOD WORK DONE FOR ORTHO, BUT THE RESULTS CAME BACK ABNORMAL AND SHE WOULD LIKE TO TALK TO DR. HERNANDEZ OR CLINICAL TO SEE WHAT SOME OF THE HIGH NUMBERS MEAN.    PATIENT DOESN'T KNOW IF THIS IS SOMETHING THAT SHE SHOULD BE WORRIED ABOUT OR NOT.    PLEASE ADVISE  [phone]

## 2020-11-19 NOTE — TELEPHONE ENCOUNTER
ELADIO for patient regarding lab results.  Informed her that Dr. Mccray was no longer with our office, but that Jessica Brown did recommend checking a CBC with an iron panel in one month.  Advised her to call the office to get that scheduled and that we also could get her set up with another provider in out office at the same time.

## 2020-11-19 NOTE — TELEPHONE ENCOUNTER
Do you mind reviewing those labs and advising? I will let the patient know and then also explain the situation to her.  Thank you

## 2021-01-08 RX ORDER — MONTELUKAST SODIUM 10 MG/1
10 TABLET ORAL
Qty: 90 TABLET | Refills: 0 | Status: SHIPPED | OUTPATIENT
Start: 2021-01-08 | End: 2021-01-11 | Stop reason: SDUPTHER

## 2021-01-11 ENCOUNTER — TELEMEDICINE (OUTPATIENT)
Dept: FAMILY MEDICINE CLINIC | Facility: CLINIC | Age: 56
End: 2021-01-11

## 2021-01-11 DIAGNOSIS — K21.9 GASTROESOPHAGEAL REFLUX DISEASE WITHOUT ESOPHAGITIS: ICD-10-CM

## 2021-01-11 DIAGNOSIS — J30.2 SEASONAL ALLERGIES: Primary | ICD-10-CM

## 2021-01-11 DIAGNOSIS — M15.9 PRIMARY OSTEOARTHRITIS INVOLVING MULTIPLE JOINTS: ICD-10-CM

## 2021-01-11 PROCEDURE — 99214 OFFICE O/P EST MOD 30 MIN: CPT | Performed by: NURSE PRACTITIONER

## 2021-01-11 RX ORDER — MONTELUKAST SODIUM 10 MG/1
10 TABLET ORAL
Qty: 90 TABLET | Refills: 3 | Status: SHIPPED | OUTPATIENT
Start: 2021-01-11 | End: 2022-01-13 | Stop reason: SDUPTHER

## 2021-01-11 NOTE — PROGRESS NOTES
Chief Complaint  No chief complaint on file.    Subjective          Keke Stewart presents to [facility] for   Pleasant patient here for follow-up of chronic allergic rhinitis seasonal allergies takes montelukast 10 mg daily to help control allergies as well as takes Claritin during the day and some Flonase she is doing well with this once continue she has had no difficulties with this medication no anxiety or depression  She was unaware of the black box warning    Omeprazole PPI for history of GERD is presently stable just had an EGD she'll continue    She has some osteoarthritis and some arthralgias and she is seeing orthopedic as some labs but does show a mildly elevated sed rate and CRP negative rheumatic panel  She has no history of psoriasis or rheumatoid arthritis no red hot swollen joints and this is doing better  Taking anti-inflammatory helping quite a bit  With orthopedic    She'll reschedule her mammogram is been a couple years has not had a DEXA scan which she plans to out reach to her GYN  She does not get hers to [facility] she'll let me know if she needs any assistance    Colonoscopy Cologuard last year she is up-to-date here an EGD last spring          Discussed blackbox warning risk-benefit  2 montelukast stop the difficulties or problems  Healthy diet regular exercise show come in annually  For checkup    She should discuss her mildly elevated sed rate and CRP with her orthopedic specialist to see if these need to be repeated in a few months or if anything else to be done  Rh panel was negative        Objective   Vital Signs:   There were no vitals taken for this visit.    Physical Exam  Pulmonary:      Effort: Pulmonary effort is normal. No respiratory distress.   Skin:     General: Skin is dry.   Neurological:      Mental Status: She is alert and oriented to person, place, and time.   Psychiatric:         Mood and Affect: Mood normal.         Behavior: Behavior  normal.        Result Review :                 Assessment and Plan    Problem List Items Addressed This Visit     None          Follow Up   No follow-ups on file.  Patient was given instructions and counseling regarding her condition or for health maintenance advice. Please see specific information pulled into the AVS if appropriate.             Patient is doing well presently  Blackbox warning discussed with montelukast  She is doing quite well she is never had problems should she have problem the future stop the medication seek immediate treatment risk of depression anxiety psychiatric issues with black box warning    Benefit greater than risk omeprazole risk-benefit lowest effective  Discussed indication strategy to wean  She has no history of Lovett's esophagitis esophagitis dietary changes  Weaning process risk of kidney failure risk of osteoporosis  Mammograms and DEXA scans discussed  Refills     Telehealth visit 20-minute with patient permission  Should continue present   Yearly physical  Caution anti-inflammatories take Tylenol as needed

## 2021-01-13 PROBLEM — M15.0 PRIMARY OSTEOARTHRITIS INVOLVING MULTIPLE JOINTS: Status: ACTIVE | Noted: 2021-01-13

## 2021-01-13 PROBLEM — M15.9 PRIMARY OSTEOARTHRITIS INVOLVING MULTIPLE JOINTS: Status: ACTIVE | Noted: 2021-01-13

## 2021-01-18 DIAGNOSIS — K21.9 GASTROESOPHAGEAL REFLUX DISEASE WITHOUT ESOPHAGITIS: Primary | ICD-10-CM

## 2021-01-18 RX ORDER — OMEPRAZOLE 40 MG/1
40 CAPSULE, DELAYED RELEASE ORAL DAILY
Qty: 30 CAPSULE | Refills: 5 | Status: SHIPPED | OUTPATIENT
Start: 2021-01-18 | End: 2021-06-04

## 2021-02-16 ENCOUNTER — DOCUMENTATION (OUTPATIENT)
Dept: PHYSICAL THERAPY | Facility: CLINIC | Age: 56
End: 2021-02-16

## 2021-03-30 ENCOUNTER — BULK ORDERING (OUTPATIENT)
Dept: CASE MANAGEMENT | Facility: OTHER | Age: 56
End: 2021-03-30

## 2021-03-30 DIAGNOSIS — Z23 IMMUNIZATION DUE: ICD-10-CM

## 2021-05-27 ENCOUNTER — TELEPHONE (OUTPATIENT)
Dept: FAMILY MEDICINE CLINIC | Facility: CLINIC | Age: 56
End: 2021-05-27

## 2021-05-27 NOTE — TELEPHONE ENCOUNTER
She should take the lowest effective dose    Easier on the stomach if she takes it at dinnertime as wel    Diclofenac is a good anti-inflammatory it is not the safest however  Compared to others, nonetheless it helps her  As well as she is not taking a high dose    She should drink 64 ounces of water daily  She will need her kidney function checked every 6 months as well      We can discuss the risk-benefit more detail when she returns office I would just encourage her to schedule an appointment  Fasting labs prior  We can check her kidney function and we will more thoroughly discuss risk mitigation with these type of medications  Quality of life is important as well    Please change her PCP to myself as well thank you    Thanks

## 2021-05-27 NOTE — TELEPHONE ENCOUNTER
PATIENT CALLED STATING THAT SHE IS ON AN ANTIINFLAMMATORY MEDICATION FOR HER KNEE, WHICH SHE IS SEEING AN ORTHOPEDIC DOCTOR FOR. SHE WAS TOLD THAT IF SHE NEEDS TO BE ON THE MEDICATION FOR AN EXTENDED PERIOD, SHE NEEDED TO OKAY IT WITH HER PCP.    PATIENT IS TAKING DICLOFENAC SODIUM 75 MG, ONE AT NIGHT. SHE IS TRYING TO START TAKING IT ONCE EVERY OTHER NIGHT, BUT HASN'T BEEN DOING THAT  CONSISTENTLY YET.    PLEASE ADVISE  [phone]

## 2021-05-28 DIAGNOSIS — M15.9 PRIMARY OSTEOARTHRITIS INVOLVING MULTIPLE JOINTS: Primary | ICD-10-CM

## 2021-06-03 ENCOUNTER — TELEPHONE (OUTPATIENT)
Dept: GASTROENTEROLOGY | Facility: CLINIC | Age: 56
End: 2021-06-03

## 2021-06-03 NOTE — TELEPHONE ENCOUNTER
Please schedule a follow-up visit.  Okay for in person or telehealth, which ever she would prefer.

## 2021-06-03 NOTE — TELEPHONE ENCOUNTER
Patient called stating she feels like her GERD has been acting up. She has had some SOA and has to cough to catch a breath sometimes. She does not feel sick and states she experienced this last time her GERD was acting up. She states she still takes omeprazole daily, but did not know if she should increase her daily intake? Or if you want her in for a follow up. Last OV was 10/05/20. Please advise.

## 2021-06-03 NOTE — PROGRESS NOTES
"Chief Complaint   Patient presents with   • GI Problem         History of Present Illness  Patient is a 56-year-old female who presents today for follow-up.  She has a history of GERD, hiatal hernia.    Patient presents today with concerns about worsening reflux symptoms.  She reports over the last 2 weeks she has noted recurrent reflux symptoms.  She reports primarily respiratory type symptoms with coughing and which she describes as a feeling of \"asthma in her throat\".  These are the symptoms she experienced previously prior to starting treatment for GERD.  She has also noted some belching and regurgitation.  She denies any dysphagia.  Denies any nausea or vomiting.  Denies any significant abdominal pain.  Denies any bowel complaints.    She had been on NSAIDs over the last 6 months that she is working on weaning off.  She otherwise denies any new medications or dietary changes.    You have chosen to receive care through a telephone visit.   Do you consent to use a telephone visit for your medical care today? Yes    Review of Systems   HENT: Negative for trouble swallowing.    Respiratory: Positive for cough.          Result Review :      Tissue Pathology Exam (08/12/2020 09:30)   ENDOSCOPY, INT (08/12/2020)   Office Visit with Shanon Dee APRN (10/05/2020)       Physical Exam - TELEPHONE VISIT      Assessment and Plan    Diagnoses and all orders for this visit:    1. Gastroesophageal reflux disease without esophagitis (Primary)    2. Hiatal hernia    Other orders  -     pantoprazole (PROTONIX) 40 MG EC tablet; Take 1 tablet by mouth Daily.  Dispense: 30 tablet; Refill: 5         Discussion  Patient presents today for follow-up with concerns about worsening GERD symptoms.  Discussed with her today this may be secondary to recent NSAID use or tolerance that has developed to omeprazole.  We will change to alternative proton pump inhibitor and recommended she continue to limit the NSAIDs as much as possible.  " Dietary and lifestyle modifications to help with reflux are reviewed today.      This visit has been rescheduled as a phone visit to comply with patient safety concerns in accordance with CDC recommendations. Total time of discussion was 7 minutes.    Follow Up   Return if symptoms worsen or fail to improve.    Patient Instructions   For GERD, stop omeprazole and begin trial of pantoprazole as prescribed.  Prescription sent to pharmacy.    For GERD with hiatal hernia, follow antireflux precautions.  Recommend avoiding eating within 3 to 4 hours of bedtime.  Avoid foods that can trigger symptoms which may include citrus fruits, spicy foods, tomatoes and red sauces, chocolate, coffee/tea, caffeinated or carbonated beverages, alcohol.    Recommend limiting NSAID medications as much as possible. If needed for pain, it is okay to take Tylenol (acetaminophen) available over-the-counter.  Do not exceed recommended daily dose.    Call for any new or worsening symptoms or failure to improve.

## 2021-06-04 ENCOUNTER — OFFICE VISIT (OUTPATIENT)
Dept: GASTROENTEROLOGY | Facility: CLINIC | Age: 56
End: 2021-06-04

## 2021-06-04 DIAGNOSIS — K21.9 GASTROESOPHAGEAL REFLUX DISEASE WITHOUT ESOPHAGITIS: Primary | ICD-10-CM

## 2021-06-04 DIAGNOSIS — K44.9 HIATAL HERNIA: ICD-10-CM

## 2021-06-04 PROCEDURE — 99441 PR PHYS/QHP TELEPHONE EVALUATION 5-10 MIN: CPT | Performed by: NURSE PRACTITIONER

## 2021-06-04 RX ORDER — PANTOPRAZOLE SODIUM 40 MG/1
40 TABLET, DELAYED RELEASE ORAL DAILY
Qty: 30 TABLET | Refills: 5 | Status: SHIPPED | OUTPATIENT
Start: 2021-06-04 | End: 2021-12-20

## 2021-06-04 RX ORDER — DICLOFENAC SODIUM 75 MG/1
75 TABLET, DELAYED RELEASE ORAL AS NEEDED
COMMUNITY
Start: 2021-04-27

## 2021-06-04 NOTE — PATIENT INSTRUCTIONS
For GERD, stop omeprazole and begin trial of pantoprazole as prescribed.  Prescription sent to pharmacy.    For GERD with hiatal hernia, follow antireflux precautions.  Recommend avoiding eating within 3 to 4 hours of bedtime.  Avoid foods that can trigger symptoms which may include citrus fruits, spicy foods, tomatoes and red sauces, chocolate, coffee/tea, caffeinated or carbonated beverages, alcohol.    Recommend limiting NSAID medications as much as possible. If needed for pain, it is okay to take Tylenol (acetaminophen) available over-the-counter.  Do not exceed recommended daily dose.    Call for any new or worsening symptoms or failure to improve.

## 2021-07-21 ENCOUNTER — OFFICE VISIT (OUTPATIENT)
Dept: FAMILY MEDICINE CLINIC | Facility: CLINIC | Age: 56
End: 2021-07-21

## 2021-07-21 VITALS
DIASTOLIC BLOOD PRESSURE: 90 MMHG | SYSTOLIC BLOOD PRESSURE: 146 MMHG | HEIGHT: 63 IN | WEIGHT: 184 LBS | HEART RATE: 114 BPM | BODY MASS INDEX: 32.6 KG/M2 | OXYGEN SATURATION: 98 % | TEMPERATURE: 96.9 F

## 2021-07-21 DIAGNOSIS — M25.50 ARTHRALGIA, UNSPECIFIED JOINT: ICD-10-CM

## 2021-07-21 DIAGNOSIS — L30.9 DERMATITIS: Primary | ICD-10-CM

## 2021-07-21 DIAGNOSIS — R74.8 ELEVATED ALKALINE PHOSPHATASE LEVEL: ICD-10-CM

## 2021-07-21 PROCEDURE — 99214 OFFICE O/P EST MOD 30 MIN: CPT | Performed by: NURSE PRACTITIONER

## 2021-07-21 RX ORDER — TOLTERODINE TARTRATE 2 MG/1
2 TABLET, EXTENDED RELEASE ORAL AS NEEDED
COMMUNITY
Start: 2021-06-10

## 2021-07-21 RX ORDER — CLOTRIMAZOLE AND BETAMETHASONE DIPROPIONATE 10; .64 MG/G; MG/G
CREAM TOPICAL 2 TIMES DAILY
Qty: 45 G | Refills: 0 | Status: SHIPPED | OUTPATIENT
Start: 2021-07-21 | End: 2022-07-15

## 2021-07-23 ENCOUNTER — TELEPHONE (OUTPATIENT)
Dept: FAMILY MEDICINE CLINIC | Facility: CLINIC | Age: 56
End: 2021-07-23

## 2021-07-23 LAB
ALBUMIN SERPL-MCNC: 4.4 G/DL (ref 3.5–5.2)
ALBUMIN/GLOB SERPL: 1.6 G/DL
ALP SERPL-CCNC: 149 U/L (ref 39–117)
ALT SERPL-CCNC: 15 U/L (ref 1–33)
AST SERPL-CCNC: 17 U/L (ref 1–32)
BASOPHILS # BLD AUTO: 0.07 10*3/MM3 (ref 0–0.2)
BASOPHILS NFR BLD AUTO: 0.8 % (ref 0–1.5)
BILIRUB SERPL-MCNC: 0.3 MG/DL (ref 0–1.2)
BUN SERPL-MCNC: 8 MG/DL (ref 6–20)
BUN/CREAT SERPL: 10 (ref 7–25)
CALCIUM SERPL-MCNC: 9.9 MG/DL (ref 8.6–10.5)
CCP IGA+IGG SERPL IA-ACNC: 4 UNITS (ref 0–19)
CHLORIDE SERPL-SCNC: 103 MMOL/L (ref 98–107)
CO2 SERPL-SCNC: 25.9 MMOL/L (ref 22–29)
CREAT SERPL-MCNC: 0.8 MG/DL (ref 0.57–1)
CRP SERPL-MCNC: 2.21 MG/DL (ref 0–0.5)
EOSINOPHIL # BLD AUTO: 0.01 10*3/MM3 (ref 0–0.4)
EOSINOPHIL NFR BLD AUTO: 0.1 % (ref 0.3–6.2)
ERYTHROCYTE [DISTWIDTH] IN BLOOD BY AUTOMATED COUNT: 13.7 % (ref 12.3–15.4)
ERYTHROCYTE [SEDIMENTATION RATE] IN BLOOD BY WESTERGREN METHOD: 24 MM/HR (ref 0–30)
FERRITIN SERPL-MCNC: 137 NG/ML (ref 13–150)
GLOBULIN SER CALC-MCNC: 2.8 GM/DL
GLUCOSE SERPL-MCNC: 102 MG/DL (ref 65–99)
HCT VFR BLD AUTO: 40.7 % (ref 34–46.6)
HGB BLD-MCNC: 12.7 G/DL (ref 12–15.9)
IMM GRANULOCYTES # BLD AUTO: 0.02 10*3/MM3 (ref 0–0.05)
IMM GRANULOCYTES NFR BLD AUTO: 0.2 % (ref 0–0.5)
LYMPHOCYTES # BLD AUTO: 1.31 10*3/MM3 (ref 0.7–3.1)
LYMPHOCYTES NFR BLD AUTO: 15.4 % (ref 19.6–45.3)
MCH RBC QN AUTO: 27.7 PG (ref 26.6–33)
MCHC RBC AUTO-ENTMCNC: 31.2 G/DL (ref 31.5–35.7)
MCV RBC AUTO: 88.9 FL (ref 79–97)
MONOCYTES # BLD AUTO: 0.38 10*3/MM3 (ref 0.1–0.9)
MONOCYTES NFR BLD AUTO: 4.5 % (ref 5–12)
NEUTROPHILS # BLD AUTO: 6.71 10*3/MM3 (ref 1.7–7)
NEUTROPHILS NFR BLD AUTO: 79 % (ref 42.7–76)
NRBC BLD AUTO-RTO: 0 /100 WBC (ref 0–0.2)
PLATELET # BLD AUTO: 263 10*3/MM3 (ref 140–450)
POTASSIUM SERPL-SCNC: 4.4 MMOL/L (ref 3.5–5.2)
PROT SERPL-MCNC: 7.2 G/DL (ref 6–8.5)
RBC # BLD AUTO: 4.58 10*6/MM3 (ref 3.77–5.28)
SODIUM SERPL-SCNC: 142 MMOL/L (ref 136–145)
URATE SERPL-MCNC: 3.6 MG/DL (ref 2.4–5.7)
WBC # BLD AUTO: 8.5 10*3/MM3 (ref 3.4–10.8)

## 2021-07-23 NOTE — TELEPHONE ENCOUNTER
PT CALLED WANTING TO KNOW HER LAB RESULTS. HUB SAID THAT PT SAID SHE WAS GOING TO GO CRAZY IF NO ONE GOES OVER HER LAB RESULTS WITH HER. STATED THAT SHE WONT MAKE IT OVER THE WEEKEND.

## 2021-07-23 NOTE — TELEPHONE ENCOUNTER
Please call patient today    Liver function kidney function all normal  CBC acceptable  Gout test with uric acid negative  Cc antibody is negative which is a rheumatoid arthritis test  Ferritin is normal test for inflammation  Sed rate negative test for inflammation      CRP is elevated showing inflammation  Alkaline phosphatase mildly elevated    Nothing here is worrisome    That said I would like to set her up for a bone scan related to the alkaline phosphatase and her alcohol which is to make sure everything looks okay here      I would like to get a bone scan  Let me know if she is willing    If so please order a bone scan  Diagnosis elevated CRP  Arthralgias  Elevated alkaline phosphatase    As long as the bone scan comes back normal, I will likely refer her to rheumatology for second opinion to make sure things okay thank you      Please also add AILYN, serum and AILYN random urine to patient's existing lab thank you!

## 2021-07-23 NOTE — TELEPHONE ENCOUNTER
Caller: Keke Stewart    Relationship: Self    Best call back number: [phone]    Caller requesting test results: SELF    What test was performed: LABS    When was the test performed: 07/21/2021    Where was the test performed: IN OFFICE     Additional notes: PATIENT WOULD LIKE TO HAVE LAB RESULTS EXPLAINED TO HER

## 2021-07-23 NOTE — TELEPHONE ENCOUNTER
Caller: Keke Stewart    Relationship: Self    Best call back number: [phone]*    What is the best time to reach you: ANYTIME    Who are you requesting to speak with (clinical staff, provider,  specific staff member): JAMES EPLEY    What was the call regarding: PATIENT IS REQUESTING A CALL FROM JAMES EPLEY TO GO OVER LAB RESULTS WITH HER. THE PATIENT STATES SHE IS WANTING TO SPEAK TO JAMES EPLEY BEFORE THE WEEKEND BECAUSE SHE IS WORRIED ABOUT HER LAB RESULTS.    Do you require a callback: YES, PLEASE.

## 2021-07-27 LAB
ALBUMIN SERPL ELPH-MCNC: 3.9 G/DL (ref 2.9–4.4)
ALBUMIN/GLOB SERPL: 1.1 {RATIO} (ref 0.7–1.7)
ALPHA1 GLOB SERPL ELPH-MCNC: 0.3 G/DL (ref 0–0.4)
ALPHA2 GLOB SERPL ELPH-MCNC: 0.9 G/DL (ref 0.4–1)
B-GLOBULIN SERPL ELPH-MCNC: 1.2 G/DL (ref 0.7–1.3)
GAMMA GLOB SERPL ELPH-MCNC: 1.2 G/DL (ref 0.4–1.8)
GLOBULIN SER-MCNC: 3.6 G/DL (ref 2.2–3.9)
IGA SERPL-MCNC: 172 MG/DL (ref 87–352)
IGG SERPL-MCNC: 1101 MG/DL (ref 586–1602)
IGM SERPL-MCNC: 165 MG/DL (ref 26–217)
INTERPRETATION SERPL IEP-IMP: NORMAL
LABORATORY COMMENT REPORT: NORMAL
Lab: NORMAL
M PROTEIN SERPL ELPH-MCNC: NORMAL G/DL
PROT SERPL-MCNC: 7.5 G/DL (ref 6–8.5)
WRITTEN AUTHORIZATION: NORMAL

## 2021-07-27 NOTE — PROGRESS NOTES
"Ta Stewart is a 56 y.o. female.     Patient complains of a itchy rash beneath both breast over the last week or so she has no breast pain no rash on her breast itself  No lumps or bumps or other complaints, she would like me to perform a breast exam as well as I think she needs a mammogram    Overall she is doing well she is seeing orthopedics etc. for aches and pains no new complaints  She has some arthralgias last fall her sed rate and CRP were mild to moderately elevated I want to repeat these  No new arthralgias or joints    Liver enzymes are normal alkaline phosphatase mildly elevated slow gradual increase      Rash  Pertinent negatives include no cough, fatigue, fever or shortness of breath.        /90   Pulse 114   Temp 96.9 °F (36.1 °C) (Temporal)   Ht 160 cm (63\")   Wt 83.5 kg (184 lb)   LMP  (LMP Unknown)   SpO2 98%   BMI 32.59 kg/m²       The following portions of the patient's history were reviewed and updated as appropriate: allergies, current medications, past family history, past medical history, past social history, past surgical history and problem list.    Review of Systems   Constitutional: Negative for chills, fatigue, fever and unexpected weight loss.   HENT: Negative.  Negative for trouble swallowing.    Eyes: Negative.    Respiratory: Negative for cough and shortness of breath.    Cardiovascular: Negative for chest pain, palpitations and leg swelling.   Gastrointestinal: Negative for abdominal pain and blood in stool.   Genitourinary: Negative.  Negative for pelvic pain.   Musculoskeletal: Negative.    Skin: Positive for rash.   Neurological: Negative.  Negative for dizziness, speech difficulty, weakness and confusion.   Psychiatric/Behavioral: Negative.        Objective   Physical Exam  Vitals reviewed.   Constitutional:       Appearance: She is well-developed.   HENT:      Head: Normocephalic.      Nose: Nose normal.   Eyes:      General: No scleral " icterus.     Conjunctiva/sclera: Conjunctivae normal.      Pupils: Pupils are equal, round, and reactive to light.   Neck:      Thyroid: No thyromegaly.      Vascular: No JVD.   Cardiovascular:      Rate and Rhythm: Normal rate and regular rhythm.      Heart sounds: Normal heart sounds. No murmur heard.   No friction rub. No gallop.    Pulmonary:      Effort: Pulmonary effort is normal. No respiratory distress.      Breath sounds: Normal breath sounds. No stridor. No wheezing or rales.      Comments: Breast exam pendulous breasts no nipple discharge equal in size bilaterally skin appears normal  No breast erythema or lesions, no lumps or bumps axilla clear bilaterally beneath the breast a mild but demarcated erythema without increased heat with some satellite lesions beneath each breast relatively mild and approaches the center breast  Consistent with candidiasis and mild dermatitis  Abdominal:      General: Bowel sounds are normal. There is no distension.      Palpations: Abdomen is soft.      Tenderness: There is no abdominal tenderness.      Comments: No hepatosplenomegaly, no ascites,   Musculoskeletal:         General: No tenderness.      Cervical back: Neck supple.   Lymphadenopathy:      Cervical: No cervical adenopathy.   Skin:     General: Skin is warm and dry.      Findings: No erythema or rash.   Neurological:      Mental Status: She is alert and oriented to person, place, and time.      Deep Tendon Reflexes: Reflexes are normal and symmetric.   Psychiatric:         Behavior: Behavior normal.         Thought Content: Thought content normal.         Judgment: Judgment normal.           Assessment/Plan   Diagnoses and all orders for this visit:    1. Dermatitis (Primary)  -     C-reactive protein  -     Sedimentation Rate  -     Cyclic Citrul Peptide Antibody, IgG / IgA  -     Uric Acid  -     Ferritin  -     CBC & Differential  -     Comprehensive metabolic panel    2. Arthralgia, unspecified joint  -      C-reactive protein  -     Sedimentation Rate  -     Cyclic Citrul Peptide Antibody, IgG / IgA  -     Uric Acid  -     Ferritin  -     CBC & Differential  -     Comprehensive metabolic panel    3. Elevated alkaline phosphatase level  -     C-reactive protein  -     Sedimentation Rate  -     Cyclic Citrul Peptide Antibody, IgG / IgA  -     Uric Acid  -     Ferritin  -     CBC & Differential  -     Comprehensive metabolic panel    Other orders  -     clotrimazole-betamethasone (Lotrisone) 1-0.05 % cream; Apply  topically to the appropriate area as directed 2 (Two) Times a Day. Sparingly for up to 2 weeks then discontinue avoid face  Dispense: 45 g; Refill: 0                  Patient Instructions   Discharge instructions mammogram tomorrow    Very thin layer of Lotrisone pat dry using a hair dryer on cool to dry  Keep this area dry should clear up in a couple weeks then you can switch to good moisturizer after your shower but always keep this area dry    Any worsening symptoms please notify me,  Avoid any chronic use avoid face    Continue healthy diet regular exercise  Challenge you quality of life  Gradual changes over the next 12 to 18 months consider intermittent fasting calories less than 1400 vegetables vegetables vegetables healthy fruit with fiber  Less Posta bread more chicken fish    Consider weight watchers    Consider an exercise bike gently daily    Diclofenac gel 3 times a day on your knees will do substantial benefit likely and less risk than oral    Recheck blood pressure

## 2021-08-10 ENCOUNTER — TELEPHONE (OUTPATIENT)
Dept: FAMILY MEDICINE CLINIC | Facility: CLINIC | Age: 56
End: 2021-08-10

## 2021-08-10 NOTE — TELEPHONE ENCOUNTER
Caller: Keke Stewart    Relationship: Self    Best call back number: [phone] (M)    What is the best time to reach you: ANYTIME    Who are you requesting to speak with:   JAMES EPLEY APRN       Do you know the name of the person who called:PATIENT    What was the call regarding: PATIENT HAS A MOLE ON HER BACK, BOTTOM HALF IS RED AND SHE IS VERY WORRIED ABOUT IT, PATIENT WOULD LIKE A CALLBACK TO DISCUSS. PATIENT HAS AN APPOINTMENT ON Thursday. PLEASE ADVISE. THANK YOU.      Do you require a callback: YES

## 2021-08-10 NOTE — TELEPHONE ENCOUNTER
PATIENT CALLED STATING THAT SHE WAS ABLE TO GET IN WITH HER DERMATOLOGIST AND WOULD LIKE JAMES EPLEY TO DISREGARD THIS REQUEST.    CALL BACK IF NEEDED:  [phone]

## 2021-09-22 ENCOUNTER — TELEPHONE (OUTPATIENT)
Dept: GASTROENTEROLOGY | Facility: CLINIC | Age: 56
End: 2021-09-22

## 2021-09-22 DIAGNOSIS — Z12.11 ENCOUNTER FOR SCREENING FOR MALIGNANT NEOPLASM OF COLON: Primary | ICD-10-CM

## 2021-09-23 NOTE — TELEPHONE ENCOUNTER
Yes, based on Shanon's office note in 2020 patient is due for Cologuard in 2021.    Please arrange Cologuard order in CC name for colon cancer screening and let patient know that we will be happy to arrange.  Please let patient know details of ordering the test and what to expect such as mail-order kit.  Thanks again.  Isacc

## 2021-09-27 NOTE — TELEPHONE ENCOUNTER
Patient left voicemail regarding status of cologuard order. Order faxed in. Called patient back to inform of above; trc.

## 2021-09-28 DIAGNOSIS — Z12.5 PROSTATE CANCER SCREENING: Primary | ICD-10-CM

## 2021-10-07 ENCOUNTER — APPOINTMENT (OUTPATIENT)
Dept: WOMENS IMAGING | Facility: HOSPITAL | Age: 56
End: 2021-10-07

## 2021-10-07 PROCEDURE — 77063 BREAST TOMOSYNTHESIS BI: CPT | Performed by: RADIOLOGY

## 2021-10-07 PROCEDURE — 77067 SCR MAMMO BI INCL CAD: CPT | Performed by: RADIOLOGY

## 2021-12-20 RX ORDER — PANTOPRAZOLE SODIUM 40 MG/1
40 TABLET, DELAYED RELEASE ORAL DAILY
Qty: 30 TABLET | Refills: 5 | Status: SHIPPED | OUTPATIENT
Start: 2021-12-20 | End: 2022-06-16

## 2022-01-13 RX ORDER — MONTELUKAST SODIUM 10 MG/1
10 TABLET ORAL
Qty: 90 TABLET | Refills: 3 | Status: SHIPPED | OUTPATIENT
Start: 2022-01-13 | End: 2023-01-30

## 2022-01-13 NOTE — TELEPHONE ENCOUNTER
Caller: Keke Stewart    Relationship: Self    Best call back number: [phone]    Requested Prescriptions:   Requested Prescriptions     Pending Prescriptions Disp Refills   • montelukast (SINGULAIR) 10 MG tablet 90 tablet 3     Sig: Take 1 tablet by mouth every night at bedtime.        Pharmacy where request should be sent: [id number] [address] AT [facility] [phone] [facility] [phone] FX       Does the patient have less than a 3 day supply:  [x] Yes  [] No    Taras Rey Rep   01/13/22 15:29 EST

## 2022-03-25 ENCOUNTER — TELEPHONE (OUTPATIENT)
Dept: FAMILY MEDICINE CLINIC | Facility: CLINIC | Age: 57
End: 2022-03-25

## 2022-03-25 NOTE — TELEPHONE ENCOUNTER
This was entered in her chart  In 2019 prior to seeing me    Almost everyone has had anxiety diagnosis on their chart 1 time another most the time  Insurance companies disability etc. would not be too concerned about that in most situations    They tend to be more concerned regarding heart disease lung disease history of stroke  Chronic muscle skeletal problems or unmanaged conditions such as uncontrolled blood pressure etc. or loose ends on labs    Thanks

## 2022-03-25 NOTE — TELEPHONE ENCOUNTER
Caller: Keke Stewart    Relationship: Self    Best call back number: [phone]    What was the call regarding: PATIENT IS APPLYING FOR LONG TERM CARE DISABILITY INSURANCE.  IN HER CHART IT STATES THAT SHE WAS DIAGNOSED WITH ANXIETY.  PATIENT HAS NO MEMORY OF THIS DIAGNOSIS.  PLEASE ADVISE    Do you require a callback: YES

## 2022-03-25 NOTE — TELEPHONE ENCOUNTER
Please tell patient looks like she is due a follow-up has a few loose ends we need to follow-up on        To whom it may concern,      To my knowledge Keke Pat, has no history of abnormal chronic anxiety, depression,  or other significant psychiatric illnesses.      Sincerely,        James Epley, APRN, FNP  3/25/2022

## 2022-03-25 NOTE — TELEPHONE ENCOUNTER
Pt voiced her understanding but is wanting to know if she can have something in writing or some kind of letter that states she does not have a diagnosis of anxiety. She told her insurance company that she doesn't have anxiety and pt states they are accusing her of lying. She just wants something on paper that states she does not have anxiety. Pt also states she was unaware of her ever being diagnosed with anxiety she states Dr Mccray never mentioned to her about her having anxiety.

## 2022-03-28 ENCOUNTER — TELEPHONE (OUTPATIENT)
Dept: FAMILY MEDICINE CLINIC | Facility: CLINIC | Age: 57
End: 2022-03-28

## 2022-03-28 NOTE — TELEPHONE ENCOUNTER
"  Caller: Keke Stewart    Relationship: Self    Best call back number: [phone]    What was the call regarding: PATIENT WAS CALLING IN REGARDS TO THE LETTER MR JAMES EPLEY WROTE FOR HER. PATIENT STATED THAT SHE NEEDS THE LETTER TO BE UPDATED TODAY TO SAY THAT JAMES EPLEY DID NOT MAKE THE NOTE ON THE CHART FOR THE DIAGNOSIS OF ANXIETY, AND THAT IT WAS DR HERNANDEZ WHO MADE THE NOTE WHEN HE WAS A PART OF THE PRACTICE, BUT HE IS NO LONGER A PART OF THE PRACTICE. PATIENT ALSO NEEDS HIM TO INCLUDE IN THE LETTER THAT SHE HAS NEVER RECEIVED ANY TREATMENT FOR ANXIETY AND TO TAKE THE WORD \"SIGNIFICANT\" OUT. PLEASE ADVISE.    Do you require a callback: YES      "

## 2022-03-28 NOTE — TELEPHONE ENCOUNTER
Caller: Keke Stewart    Relationship: Self    Best call back number: [phone]    PATIENT STATESTHE OFFICE HAS A LETTER FOR LONG TERM INSURANCE. PATIENT IS NOT GOING TO BE IN TO THE OFFICE TO  THE LETTER UNTIL EPLEY REVISES IT

## 2022-04-04 NOTE — TELEPHONE ENCOUNTER
I have already written the letter  She is welcome to make appointment if he would like a letter self-explanatory    Nothing else I can do most of the conversations regarding anxiety were before seeing me.      Thank you

## 2022-05-18 ENCOUNTER — TELEPHONE (OUTPATIENT)
Dept: FAMILY MEDICINE CLINIC | Facility: CLINIC | Age: 57
End: 2022-05-18

## 2022-05-18 NOTE — TELEPHONE ENCOUNTER
Caller: Keke Stewart    Relationship: Self    Best call back number: [phone]       What is the best time to reach you: ANYTIME     Who are you requesting to speak with (clinical staff, provider,  specific staff member): CLINICAL STAFF    What was the call regarding: PATIENT IS WANTING TO KNOW IF JAMES EPLEY WOULD RECOMMEND HER GETTING THE SECOND COVID BOOSTER SHOT. PLEASE CALL AND ADVISE     Do you require a callback: YES

## 2022-05-18 NOTE — TELEPHONE ENCOUNTER
Yes it appears safe and she is less likely to get nasty COVID infection to be sick for a couple weeks    Hope she is doing well thank you

## 2022-06-08 NOTE — PROGRESS NOTES
Subjective   Keke Stewart is a 53 y.o. female.   Chief Complaint   Patient presents with   • sinus pressure     pt states has not gotten any better and mainly on the right side      Vitals:    12/24/18 0958   BP: 134/80   Pulse: 82   Resp: 16   Temp: 97.7 °F (36.5 °C)   SpO2: 99%     No LMP recorded. Patient is postmenopausal.    History of Present Illness   Keke is here for a follow up for sinusitis. She was seen on 12/11/2018 and was given ceftin. She finished it 4 days ago and continues to have left maxillary sinus pressure and congestion. She denies fever, chills or body aches. She has taken sudafed, and use flonase with mild relief. She denies fever.     The following portions of the patient's history were reviewed and updated as appropriate: allergies, current medications, past family history, past medical history, past social history, past surgical history and problem list.    Review of Systems   Constitutional: Negative for chills, fatigue and fever.   HENT: Positive for congestion, sinus pressure and sinus pain. Negative for ear pain and sore throat.    Respiratory: Negative for cough.    Cardiovascular: Negative.        Objective   Physical Exam   Constitutional: Vital signs are normal. She appears well-developed and well-nourished. She does not appear ill. No distress.   HENT:   Right Ear: Tympanic membrane and ear canal normal.   Left Ear: Tympanic membrane and ear canal normal.   Nose: Mucosal edema present. Left sinus exhibits maxillary sinus tenderness.   Mouth/Throat: Uvula is midline. Posterior oropharyngeal erythema present.   Cardiovascular: Normal rate and regular rhythm.   Pulmonary/Chest: Effort normal and breath sounds normal.   Neurological: She is alert.       Assessment/Plan   Keke was seen today for sinus pressure.    Diagnoses and all orders for this visit:    Acute maxillary sinusitis, recurrence not specified    Other orders  -     levoFLOXacin (LEVAQUIN) 500 MG tablet;  Take 1 tablet by mouth Daily.      Continue flonase,   Sudafed as needed  Start levaquin, discussed potential black box warnings  Continue sinus rinses with saline  Follow up in two weeks if symptoms persist, or sooner if symptoms worsen  May consider CT of the sinuses if no better             negative...

## 2022-06-16 RX ORDER — PANTOPRAZOLE SODIUM 40 MG/1
40 TABLET, DELAYED RELEASE ORAL DAILY
Qty: 30 TABLET | Refills: 2 | Status: SHIPPED | OUTPATIENT
Start: 2022-06-16 | End: 2022-07-15 | Stop reason: SDUPTHER

## 2022-07-15 ENCOUNTER — OFFICE VISIT (OUTPATIENT)
Dept: GASTROENTEROLOGY | Facility: CLINIC | Age: 57
End: 2022-07-15

## 2022-07-15 VITALS
WEIGHT: 194 LBS | OXYGEN SATURATION: 99 % | DIASTOLIC BLOOD PRESSURE: 90 MMHG | BODY MASS INDEX: 34.38 KG/M2 | SYSTOLIC BLOOD PRESSURE: 142 MMHG | TEMPERATURE: 97.8 F | HEIGHT: 63 IN | HEART RATE: 102 BPM

## 2022-07-15 DIAGNOSIS — K44.9 HIATAL HERNIA: ICD-10-CM

## 2022-07-15 DIAGNOSIS — K21.9 GASTROESOPHAGEAL REFLUX DISEASE WITHOUT ESOPHAGITIS: Primary | ICD-10-CM

## 2022-07-15 PROCEDURE — 99213 OFFICE O/P EST LOW 20 MIN: CPT | Performed by: NURSE PRACTITIONER

## 2022-07-15 RX ORDER — PANTOPRAZOLE SODIUM 40 MG/1
40 TABLET, DELAYED RELEASE ORAL DAILY
Qty: 90 TABLET | Refills: 3 | Status: SHIPPED | OUTPATIENT
Start: 2022-07-15

## 2022-07-15 NOTE — PATIENT INSTRUCTIONS
For GERD, continue pantoprazole daily as prescribed.    For GERD with hiatal hernia, follow antireflux precautions.  Recommend avoiding eating within 3 to 4 hours of bedtime.  Avoid foods that can trigger symptoms which may include citrus fruits, spicy foods, tomatoes and red sauces, chocolate, coffee/tea, caffeinated or carbonated beverages, alcohol.

## 2022-07-15 NOTE — PROGRESS NOTES
"Chief Complaint   Patient presents with   • GI Problem           History of Present Illness  Patient is a 57-year-old female who presents today for follow-up.  She was last seen in the office June 2021.  She has a history of GERD and hiatal hernia.  Colon cancer screening up-to-date with negative Cologuard October 2021.    Patient presents today for follow-up.  She continues on pantoprazole for GERD.  She reports overall this works well to control her symptoms.    She does note occasional symptoms at night from reflux and occasional belching or discomfort in her chest/throat.    She denies any nausea, vomiting, or dysphagia.    She reports occasional lower abdominal pain associated with loose stools.  This generally occurs at times of stress.  It occurs relatively infrequently.  Denies any blood in the stool.     Result Review :       Cologuard - Stool, Per Rectum (10/21/2021 08:07)   ENDOSCOPY, INT (08/12/2020)   Office Visit with Shanon Dee APRN (06/04/2021)   Tissue Pathology Exam (08/12/2020 09:30)   ENDOSCOPY, INT (08/12/2020)   Office Visit with Shanon Dee APRN (10/05/2020)       Vital Signs:   /90   Pulse 102   Temp 97.8 °F (36.6 °C)   Ht 160 cm (63\")   Wt 88 kg (194 lb)   SpO2 99%   BMI 34.37 kg/m²     Body mass index is 34.37 kg/m².     Physical Exam  Vitals reviewed.   Constitutional:       General: She is not in acute distress.     Appearance: She is well-developed.   HENT:      Head: Normocephalic and atraumatic.   Pulmonary:      Effort: Pulmonary effort is normal. No respiratory distress.   Abdominal:      General: Abdomen is flat. Bowel sounds are normal. There is no distension.      Palpations: Abdomen is soft.      Tenderness: There is no abdominal tenderness.   Skin:     General: Skin is dry.      Coloration: Skin is not pale.   Neurological:      Mental Status: She is alert and oriented to person, place, and time.   Psychiatric:         Thought Content: Thought content " normal.           Assessment and Plan    Diagnoses and all orders for this visit:    1. Gastroesophageal reflux disease without esophagitis (Primary)    2. Hiatal hernia    Other orders  -     pantoprazole (PROTONIX) 40 MG EC tablet; Take 1 tablet by mouth Daily.  Dispense: 90 tablet; Refill: 3         Discussion  Patient presents today for follow-up of reflux and hiatal hernia.  Symptoms are stable and controlled at this time and will continue current treatment.  Reviewed dietary modifications to help with reflux in the setting of hiatal hernia.          Follow Up   Return in about 1 year (around 7/15/2023), or if symptoms worsen or fail to improve.    Patient Instructions   For GERD, continue pantoprazole daily as prescribed.    For GERD with hiatal hernia, follow antireflux precautions.  Recommend avoiding eating within 3 to 4 hours of bedtime.  Avoid foods that can trigger symptoms which may include citrus fruits, spicy foods, tomatoes and red sauces, chocolate, coffee/tea, caffeinated or carbonated beverages, alcohol.

## 2022-07-20 ENCOUNTER — TELEPHONE (OUTPATIENT)
Dept: FAMILY MEDICINE CLINIC | Facility: CLINIC | Age: 57
End: 2022-07-20

## 2022-07-20 NOTE — TELEPHONE ENCOUNTER
Keke Stewart called in regarding her BP-last reading was 145/90 this morning. She has been monitoring and will continue until her appt Monday.    Please advise

## 2022-07-21 ENCOUNTER — OFFICE VISIT (OUTPATIENT)
Dept: FAMILY MEDICINE CLINIC | Facility: CLINIC | Age: 57
End: 2022-07-21

## 2022-07-21 VITALS
TEMPERATURE: 97.2 F | OXYGEN SATURATION: 99 % | WEIGHT: 193.6 LBS | SYSTOLIC BLOOD PRESSURE: 160 MMHG | RESPIRATION RATE: 14 BRPM | HEART RATE: 108 BPM | BODY MASS INDEX: 34.3 KG/M2 | DIASTOLIC BLOOD PRESSURE: 88 MMHG | HEIGHT: 63 IN

## 2022-07-21 DIAGNOSIS — R73.9 HYPERGLYCEMIA: ICD-10-CM

## 2022-07-21 DIAGNOSIS — F43.21 GRIEF: ICD-10-CM

## 2022-07-21 DIAGNOSIS — F41.8 SITUATIONAL ANXIETY: ICD-10-CM

## 2022-07-21 DIAGNOSIS — I10 ESSENTIAL HYPERTENSION: Primary | ICD-10-CM

## 2022-07-21 DIAGNOSIS — Z00.00 HEALTH MAINTENANCE EXAMINATION: ICD-10-CM

## 2022-07-21 PROCEDURE — 99214 OFFICE O/P EST MOD 30 MIN: CPT | Performed by: NURSE PRACTITIONER

## 2022-07-21 RX ORDER — METOPROLOL SUCCINATE 50 MG/1
50 TABLET, EXTENDED RELEASE ORAL DAILY
Qty: 30 TABLET | Refills: 2 | Status: SHIPPED | OUTPATIENT
Start: 2022-07-21 | End: 2022-10-17

## 2022-07-22 LAB
ALBUMIN SERPL-MCNC: 4.7 G/DL (ref 3.8–4.9)
ALBUMIN/GLOB SERPL: 1.8 {RATIO} (ref 1.2–2.2)
ALP SERPL-CCNC: 130 IU/L (ref 44–121)
ALT SERPL-CCNC: 20 IU/L (ref 0–32)
APPEARANCE UR: CLEAR
AST SERPL-CCNC: 19 IU/L (ref 0–40)
BACTERIA #/AREA URNS HPF: NORMAL /[HPF]
BASOPHILS # BLD AUTO: 0.1 X10E3/UL (ref 0–0.2)
BASOPHILS NFR BLD AUTO: 1 %
BILIRUB SERPL-MCNC: 0.4 MG/DL (ref 0–1.2)
BILIRUB UR QL STRIP: NEGATIVE
BUN SERPL-MCNC: 10 MG/DL (ref 6–24)
BUN/CREAT SERPL: 11 (ref 9–23)
CALCIUM SERPL-MCNC: 10.1 MG/DL (ref 8.7–10.2)
CASTS URNS QL MICRO: NORMAL /LPF
CHLORIDE SERPL-SCNC: 103 MMOL/L (ref 96–106)
CHOLEST SERPL-MCNC: 178 MG/DL (ref 100–199)
CO2 SERPL-SCNC: 26 MMOL/L (ref 20–29)
COLOR UR: YELLOW
CREAT SERPL-MCNC: 0.91 MG/DL (ref 0.57–1)
EGFRCR SERPLBLD CKD-EPI 2021: 73 ML/MIN/1.73
EOSINOPHIL # BLD AUTO: 0 X10E3/UL (ref 0–0.4)
EOSINOPHIL NFR BLD AUTO: 1 %
EPI CELLS #/AREA URNS HPF: NORMAL /HPF (ref 0–10)
ERYTHROCYTE [DISTWIDTH] IN BLOOD BY AUTOMATED COUNT: 12.6 % (ref 11.7–15.4)
GLOBULIN SER CALC-MCNC: 2.6 G/DL (ref 1.5–4.5)
GLUCOSE SERPL-MCNC: 98 MG/DL (ref 65–99)
GLUCOSE UR QL STRIP: NEGATIVE
HBA1C MFR BLD: 5.8 % (ref 4.8–5.6)
HCT VFR BLD AUTO: 41.8 % (ref 34–46.6)
HDLC SERPL-MCNC: 53 MG/DL
HGB BLD-MCNC: 13.8 G/DL (ref 11.1–15.9)
HGB UR QL STRIP: NEGATIVE
IMM GRANULOCYTES # BLD AUTO: 0 X10E3/UL (ref 0–0.1)
IMM GRANULOCYTES NFR BLD AUTO: 0 %
KETONES UR QL STRIP: NEGATIVE
LDLC SERPL CALC-MCNC: 106 MG/DL (ref 0–99)
LDLC/HDLC SERPL: 2 RATIO (ref 0–3.2)
LEUKOCYTE ESTERASE UR QL STRIP: ABNORMAL
LYMPHOCYTES # BLD AUTO: 1.7 X10E3/UL (ref 0.7–3.1)
LYMPHOCYTES NFR BLD AUTO: 26 %
MCH RBC QN AUTO: 29.4 PG (ref 26.6–33)
MCHC RBC AUTO-ENTMCNC: 33 G/DL (ref 31.5–35.7)
MCV RBC AUTO: 89 FL (ref 79–97)
MICRO URNS: ABNORMAL
MONOCYTES # BLD AUTO: 0.5 X10E3/UL (ref 0.1–0.9)
MONOCYTES NFR BLD AUTO: 7 %
NEUTROPHILS # BLD AUTO: 4.1 X10E3/UL (ref 1.4–7)
NEUTROPHILS NFR BLD AUTO: 65 %
NITRITE UR QL STRIP: NEGATIVE
PH UR STRIP: 7 [PH] (ref 5–7.5)
PLATELET # BLD AUTO: 247 X10E3/UL (ref 150–450)
POTASSIUM SERPL-SCNC: 4.2 MMOL/L (ref 3.5–5.2)
PROT SERPL-MCNC: 7.3 G/DL (ref 6–8.5)
PROT UR QL STRIP: NEGATIVE
RBC # BLD AUTO: 4.69 X10E6/UL (ref 3.77–5.28)
RBC #/AREA URNS HPF: NORMAL /HPF (ref 0–2)
SODIUM SERPL-SCNC: 143 MMOL/L (ref 134–144)
SP GR UR STRIP: 1.01 (ref 1–1.03)
TRIGL SERPL-MCNC: 106 MG/DL (ref 0–149)
TSH SERPL DL<=0.005 MIU/L-ACNC: 1.52 UIU/ML (ref 0.45–4.5)
UROBILINOGEN UR STRIP-MCNC: 0.2 MG/DL (ref 0.2–1)
VLDLC SERPL CALC-MCNC: 19 MG/DL (ref 5–40)
WBC # BLD AUTO: 6.4 X10E3/UL (ref 3.4–10.8)
WBC #/AREA URNS HPF: NORMAL /HPF (ref 0–5)

## 2022-08-03 NOTE — PROGRESS NOTES
"Chief Complaint  Hypertension    Subjective        Keke Stewart presents to [facility]  Very pleasant patient here today follow-up essential hypertension,  Unfortunately she recently lost her  passed in December COVID.  She has been grieving, but spending time with her family   Is have no chest pain shortness of breath or other acute issues.  Takes pantoprazole for acid reflux  Blood pressure little elevated today she will recheck it is been controlled.  She is vaccinated for COVID including booster    Hypertension        Objective   Vital Signs:  /88   Pulse 108   Temp 97.2 °F (36.2 °C) (Infrared)   Resp 14   Ht 160 cm (63\")   Wt 87.8 kg (193 lb 9.6 oz)   SpO2 99%   BMI 34.29 kg/m²   Estimated body mass index is 34.29 kg/m² as calculated from the following:    Height as of this encounter: 160 cm (63\").    Weight as of this encounter: 87.8 kg (193 lb 9.6 oz).    BMI is >= 30 and <35. (Class 1 Obesity). The following options were offered after discussion;: exercise counseling/recommendations and nutrition counseling/recommendations      Physical Exam  Vitals reviewed.   Constitutional:       Appearance: Normal appearance. She is well-developed.   HENT:      Head: Normocephalic.      Nose: Nose normal.   Eyes:      General: No scleral icterus.     Conjunctiva/sclera: Conjunctivae normal.      Pupils: Pupils are equal, round, and reactive to light.   Neck:      Thyroid: No thyromegaly.      Vascular: No JVD.   Cardiovascular:      Rate and Rhythm: Normal rate and regular rhythm.      Heart sounds: Normal heart sounds. No murmur heard.    No friction rub. No gallop.   Pulmonary:      Effort: Pulmonary effort is normal. No respiratory distress.      Breath sounds: Normal breath sounds. No stridor. No wheezing or rales.   Abdominal:      General: Bowel sounds are normal. There is no distension.      Palpations: Abdomen is soft.      Tenderness: There is no abdominal " tenderness.      Comments: No hepatosplenomegaly, no ascites,   Musculoskeletal:         General: No tenderness.      Cervical back: Neck supple.   Lymphadenopathy:      Cervical: No cervical adenopathy.   Skin:     General: Skin is warm and dry.      Findings: No erythema or rash.   Neurological:      Mental Status: She is alert and oriented to person, place, and time.      Deep Tendon Reflexes: Reflexes are normal and symmetric.   Psychiatric:         Mood and Affect: Mood normal.         Behavior: Behavior normal.         Thought Content: Thought content normal.         Judgment: Judgment normal.        Result Review :                Assessment and Plan   Diagnoses and all orders for this visit:    1. Essential hypertension (Primary)  -     CBC & Differential  -     Comprehensive Metabolic Panel  -     Lipid Panel With LDL / HDL Ratio  -     TSH Rfx On Abnormal To Free T4  -     Urinalysis With Microscopic If Indicated (No Culture) - Urine, Clean Catch  -     Hemoglobin A1c    2. Grief  Comments:  Loss of her  in December 2021 to COVID  Orders:  -     CBC & Differential  -     Comprehensive Metabolic Panel  -     Lipid Panel With LDL / HDL Ratio  -     TSH Rfx On Abnormal To Free T4  -     Urinalysis With Microscopic If Indicated (No Culture) - Urine, Clean Catch  -     Hemoglobin A1c    3. Situational anxiety  -     CBC & Differential  -     Comprehensive Metabolic Panel  -     Lipid Panel With LDL / HDL Ratio  -     TSH Rfx On Abnormal To Free T4  -     Urinalysis With Microscopic If Indicated (No Culture) - Urine, Clean Catch  -     Hemoglobin A1c    4. Health maintenance examination  -     CBC & Differential  -     Comprehensive Metabolic Panel  -     Lipid Panel With LDL / HDL Ratio  -     TSH Rfx On Abnormal To Free T4  -     Urinalysis With Microscopic If Indicated (No Culture) - Urine, Clean Catch  -     Hemoglobin A1c    5. Hyperglycemia  -     CBC & Differential  -     Comprehensive Metabolic  Panel  -     Lipid Panel With LDL / HDL Ratio  -     TSH Rfx On Abnormal To Free T4  -     Urinalysis With Microscopic If Indicated (No Culture) - Urine, Clean Catch  -     Hemoglobin A1c    Other orders  -     metoprolol succinate XL (Toprol XL) 50 MG 24 hr tablet; Take 1 tablet by mouth Daily. For blood pressure  Dispense: 30 tablet; Refill: 2  -     Microscopic Examination -           I spent 30  minutes caring for Keke on this date of service. This time includes time spent by me in the following activities:preparing for the visit, reviewing tests, obtaining and/or reviewing a separately obtained history, performing a medically appropriate examination and/or evaluation , counseling and educating the patient/family/caregiver, ordering medications, tests, or procedures, documenting information in the medical record and care coordination  Follow Up   Return Labs today recheck in 3 weeks.  Patient was given instructions and counseling regarding her condition or for health maintenance advice. Please see specific information pulled into the AVS if appropriate.      Discharge instructions  Continue present plan  Strongly encourage you to join a grief group and meet some new girlfriends  Good contact with your present girlfriends, good communication family friends, continue to build a strong relationship with your daughter,  Use this anxiety and grief and energy to continue to make healthy changes to feel better breathe better  Metoprolol daily for blood pressure  Check blood pressure at least weekly on average should be less than 130/80 and greater than 110/70  Heart rate above 55  If less than 55 hold your blood pressure medication    As long as doing well follow-up  In 3 weeks  Bright lights positive music positive aromas  Walking or some type of fun activity daily consider swimming biking  Consider a new activity and develop a new skill  For fun.    Should you get COVID  Antiviral paxlovid strongly encourage you  to get this ASAP

## 2022-10-17 RX ORDER — METOPROLOL SUCCINATE 50 MG/1
50 TABLET, EXTENDED RELEASE ORAL DAILY
Qty: 90 TABLET | Refills: 1 | Status: SHIPPED | OUTPATIENT
Start: 2022-10-17

## 2022-10-17 NOTE — TELEPHONE ENCOUNTER
Rx Refill Note  Requested Prescriptions     Pending Prescriptions Disp Refills   • metoprolol succinate XL (TOPROL-XL) 50 MG 24 hr tablet [Pharmacy Med Name: METOPROLOL ER SUCCINATE 50MG TABS] 30 tablet 2     Sig: TAKE 1 TABLET BY MOUTH DAILY FOR BLOOD PRESSURE      Last office visit with prescribing clinician: 7/21/2022      Next office visit with prescribing clinician: Visit date not found            Taras Horner Rep  10/17/22, 08:58 EDT

## 2022-11-15 ENCOUNTER — TELEPHONE (OUTPATIENT)
Dept: FAMILY MEDICINE CLINIC | Facility: CLINIC | Age: 57
End: 2022-11-15

## 2022-11-15 NOTE — TELEPHONE ENCOUNTER
Called spoke to pt letting her know to keep the appointment just in case something was to change. Pt voiced her understanding

## 2022-11-15 NOTE — TELEPHONE ENCOUNTER
Hub staff attempted to follow warm transfer process and was unsuccessful     Caller: Keke Stewart    Relationship to patient: Self    Best call back number: * [phone] (Mobile)    Patient is needing: FLU TEST    THE ONLY THING THAT WAS AVAILABLE IN A TIMELY MANNER, WAS WITH DR DAVALOS TOMORROW.      IF YOU CAN GET THE PATIENT IN SOONER, OR NEEDS TO BE RESCHEDULED, PLEASE CALL AND ADVISE

## 2022-12-21 ENCOUNTER — OFFICE VISIT (OUTPATIENT)
Dept: GASTROENTEROLOGY | Facility: CLINIC | Age: 57
End: 2022-12-21

## 2022-12-21 VITALS
DIASTOLIC BLOOD PRESSURE: 98 MMHG | HEART RATE: 86 BPM | WEIGHT: 187.4 LBS | TEMPERATURE: 97.3 F | OXYGEN SATURATION: 99 % | SYSTOLIC BLOOD PRESSURE: 140 MMHG | HEIGHT: 63 IN | BODY MASS INDEX: 33.2 KG/M2

## 2022-12-21 DIAGNOSIS — K21.9 GASTROESOPHAGEAL REFLUX DISEASE WITHOUT ESOPHAGITIS: Primary | ICD-10-CM

## 2022-12-21 PROCEDURE — 99213 OFFICE O/P EST LOW 20 MIN: CPT

## 2022-12-21 RX ORDER — FAMOTIDINE 20 MG/1
20 TABLET, FILM COATED ORAL DAILY
Qty: 30 TABLET | Refills: 3 | Status: SHIPPED | OUTPATIENT
Start: 2022-12-21

## 2022-12-21 NOTE — PATIENT INSTRUCTIONS
Continue the pantoprazole 40 mg before breakfast     Start taking famotidine approximately 30-60 minutes prior to dinner

## 2022-12-21 NOTE — PROGRESS NOTES
"Chief Complaint   Patient presents with   • Heartburn           History of Present Illness    Patient is a 57 y.o. who presents to the office for follow up evaluation.  Last in office visit was on 7/15/2022 with MARTHA Pham.  Patient has a significant past medical history of GERD and hiatal hernia.  Colon cancer screening up-to-date with negative Cologuard testing in October 2021.    She presents today for follow-up evaluation.  For GERD, she continues to take pantoprazole 40 mg once daily describes symptoms as moderately controlled with occasional occurring 2-3 times per week refractory pharyngeal burning similar to that experienced prior to starting pantoprazole regimen.    Known dietary triggers to spicy foods.  Denies nausea, vomiting, or dysphagia.    Denies lower GI symptoms.  Bowel movements are regular without evidence of melena hematochezia.  Denies unintentional weight loss.    No known family history of colon cancer colon polyps, or IBD.     Result Review :         Office Visit with Shanon Dee APRN (07/15/2022)    Vital Signs:   /98   Pulse 86   Temp 97.3 °F (36.3 °C)   Ht 160 cm (63\")   Wt 85 kg (187 lb 6.4 oz)   SpO2 99%   BMI 33.20 kg/m²     Body mass index is 33.2 kg/m².     Physical Exam  Vitals reviewed.   Constitutional:       General: She is not in acute distress.     Appearance: She is well-developed.   HENT:      Head: Normocephalic and atraumatic.   Pulmonary:      Effort: Pulmonary effort is normal. No respiratory distress.   Abdominal:      General: Abdomen is flat. Bowel sounds are normal. There is no distension.      Palpations: Abdomen is soft. There is no mass.      Tenderness: There is no abdominal tenderness. There is no guarding or rebound.      Hernia: No hernia is present.   Skin:     General: Skin is dry.      Coloration: Skin is not pale.   Neurological:      Mental Status: She is alert and oriented to person, place, and time.   Psychiatric:         " Thought Content: Thought content normal.           Assessment and Plan    Diagnoses and all orders for this visit:    1. Gastroesophageal reflux disease without esophagitis (Primary)  -     famotidine (PEPCID) 20 MG tablet; Take 1 tablet by mouth Daily.  Dispense: 30 tablet; Refill: 3           Discussion:    Patient is a pleasant 57 y.o.  who presents to the office for follow up evaluation.      For GERD, will continue patient on pantoprazole 40 mg once daily approximately 30 to 60 minutes prior to breakfast.  We will also add famotidine 20 mg to be taken approximately 30 to 60 minutes prior to dinner.    Will have patient follow-up in the office after 12 weeks of adding famotidine to assess response.  To consider increasing to famotidine 40 mg if 20 mg dosing does not control symptoms.       Patient is agreeable to the outlined above treatment plan.  Verbalizes understanding and will contact office for any new or worsening concerns.  All questions answered and support provided.          Patient Instructions   Continue the pantoprazole 40 mg before breakfast     Start taking famotidine approximately 30-60 minutes prior to dinner           EMR Dragon/Transcription Disclaimer:  This document has been Dictated utilizing Dragon dictation.

## 2023-01-13 ENCOUNTER — OFFICE VISIT (OUTPATIENT)
Dept: FAMILY MEDICINE CLINIC | Facility: CLINIC | Age: 58
End: 2023-01-13
Payer: COMMERCIAL

## 2023-01-13 VITALS
HEART RATE: 83 BPM | TEMPERATURE: 96.6 F | BODY MASS INDEX: 33.93 KG/M2 | HEIGHT: 63 IN | RESPIRATION RATE: 18 BRPM | SYSTOLIC BLOOD PRESSURE: 144 MMHG | OXYGEN SATURATION: 98 % | WEIGHT: 191.5 LBS | DIASTOLIC BLOOD PRESSURE: 80 MMHG

## 2023-01-13 DIAGNOSIS — H61.21 IMPACTED CERUMEN OF RIGHT EAR: Primary | ICD-10-CM

## 2023-01-13 DIAGNOSIS — J30.1 SEASONAL ALLERGIC RHINITIS DUE TO POLLEN: ICD-10-CM

## 2023-01-13 PROCEDURE — 99213 OFFICE O/P EST LOW 20 MIN: CPT | Performed by: NURSE PRACTITIONER

## 2023-01-13 PROCEDURE — 69209 REMOVE IMPACTED EAR WAX UNI: CPT | Performed by: NURSE PRACTITIONER

## 2023-01-13 NOTE — PROGRESS NOTES
"Chief Complaint  Earache and Sore Throat (X 10 days, swollow- on side both side hurts.  Possibly sore on side)    Subjective        Keke Stewart presents to [facility]  History of Present Illness    Sofiya Stewart is a 57-year-old male who presents today with 10-day history of sore throat and ear pain.     The patient states that her symptoms will come and go. She states that when she wakes up in the morning she will have some soreness in the right ear. She states when she swallows she will experience a sharp pain occasionally. She has some sinus pressure when she bends over. She denies sneezing, wheezing, or dyspnea. She has not been experiencing rhinorrhea or congestion. She has been taking Allegra and Singulair. She states her symptoms have improved in the afternoon. When she went back to school, her symptoms started bothering her more. She does not wake up with a dry mouth. She states she still wears her mask due to losing her  to COVID-19.     She states she has an ulcer in her mouth that could be contributing to her symptoms.     Objective   Vital Signs:  /80 (BP Location: Left arm, Patient Position: Sitting, Cuff Size: Adult)   Pulse 83   Temp 96.6 °F (35.9 °C) (Temporal)   Resp 18   Ht 160 cm (62.99\")   Wt 86.9 kg (191 lb 8 oz)   SpO2 98%   BMI 33.93 kg/m²   Estimated body mass index is 33.93 kg/m² as calculated from the following:    Height as of this encounter: 160 cm (62.99\").    Weight as of this encounter: 86.9 kg (191 lb 8 oz).          Physical Exam  Vitals reviewed.   Constitutional:       General: She is not in acute distress.     Appearance: She is well-developed. She is not diaphoretic.   HENT:      Head: Normocephalic and atraumatic.      Right Ear: Tympanic membrane, ear canal and external ear normal. There is impacted cerumen.      Left Ear: Tympanic membrane, ear canal and external ear normal.      Nose: Nose normal.      Mouth/Throat: "      Pharynx: Uvula midline. No oropharyngeal exudate.   Cardiovascular:      Rate and Rhythm: Normal rate and regular rhythm.      Heart sounds: Normal heart sounds. No murmur heard.    No friction rub. No gallop.   Pulmonary:      Effort: Pulmonary effort is normal. No respiratory distress.      Breath sounds: Normal breath sounds. No wheezing or rales.   Abdominal:      General: Bowel sounds are normal. There is no distension.      Palpations: Abdomen is soft.      Tenderness: There is no abdominal tenderness.   Musculoskeletal:      Cervical back: Neck supple.   Skin:     General: Skin is warm and dry.   Neurological:      Mental Status: She is alert and oriented to person, place, and time.        Result Review :           Ear Cerumen Removal    Date/Time: 1/13/2023 10:51 AM  Performed by: Niru Kemp MA  Authorized by: Jessica Brown APRN   Consent: Verbal consent obtained.    Anesthesia:  Local Anesthetic: none  Location details: right ear  Patient tolerance: patient tolerated the procedure well with no immediate complications  Procedure type: irrigation   Sedation:  Patient sedated: no              Assessment and Plan   Diagnoses and all orders for this visit:    1. Impacted cerumen of right ear (Primary)    2. Seasonal allergic rhinitis due to pollen    Other orders  -     Ear Cerumen Removal         1. Sore throat   - The patient has dry cerumen in her right ear canal that will be irrigated in the office today. Suspect that allergies might be playing a role in this. Allergy medication: recommend holding allegra x 30 days, use claritin/xyzal or zyrtec and can switch back      Follow Up   No follow-ups on file.  Patient was given instructions and counseling regarding her condition or for health maintenance advice. Please see specific information pulled into the AVS if appropriate.       Transcribed from ambient dictation for MARTHA Domínguez by Isis Bhagat.  01/13/23   09:18  EST    Patient or patient representative verbalized consent to the visit recording.  I have personally performed the services described in this document as transcribed by the above individual, and it is both accurate and complete.  Answers for HPI/ROS submitted by the patient on 1/11/2023  Please describe your symptoms.: It's hard to describe but i am having issues with my mouth and ears and throat  Have you had these symptoms before?: No  How long have you been having these symptoms?: 1-2 weeks  What is the primary reason for your visit?: Other

## 2023-01-30 RX ORDER — MONTELUKAST SODIUM 10 MG/1
10 TABLET ORAL
Qty: 90 TABLET | Refills: 3 | Status: SHIPPED | OUTPATIENT
Start: 2023-01-30

## 2023-01-30 NOTE — TELEPHONE ENCOUNTER
Rx Refill Note  Requested Prescriptions     Pending Prescriptions Disp Refills   • montelukast (SINGULAIR) 10 MG tablet [Pharmacy Med Name: MONTELUKAST 10MG TABLETS] 90 tablet 3     Sig: TAKE 1 TABLET BY MOUTH EVERY NIGHT AT BEDTIME      Last office visit with prescribing clinician: 7/21/2022   Last telemedicine visit with prescribing clinician: 2/1/2023   Next office visit with prescribing clinician: 2/1/2023                         Would you like a call back once the refill request has been completed: [] Yes [] No    If the office needs to give you a call back, can they leave a voicemail: [] Yes [] No    Taras Horner Rep  01/30/23, 11:59 EST

## 2023-02-28 ENCOUNTER — E-VISIT (OUTPATIENT)
Dept: FAMILY MEDICINE CLINIC | Facility: TELEHEALTH | Age: 58
End: 2023-02-28
Payer: COMMERCIAL

## 2023-02-28 PROCEDURE — BRIGHTMDVISIT: Performed by: NURSE PRACTITIONER

## 2023-03-01 NOTE — EXTERNAL PATIENT INSTRUCTIONS
Note   I have prescribed Doxycycline and hydrocortisone cream. If symptoms do not improve or become worse, follow up with your PCP   Diagnosis   Tick bite   My name is Jen Namrata, and I'm a healthcare provider at [facility]. After reviewing your responses and photos, I see that your rash is due to a tick bite.   Medications   Your pharmacy   [id number] [facility] [phone]     Prescription   Doxycycline hyclate oral tablet (100mg): Take 2 tablets by mouth once for 1 dose only.   Non-prescription   Hydrocortisone topical cream (1%): Apply thin film on affected area 4 times a day as needed for up to 14 days. Discontinue sooner if symptoms clear up completely.   About your diagnosis   Ticks are small bugs that live outside in grasses, shrubs, and trees. They feed on blood from animals and people. Unlike other bugs that bite, ticks tend to stay attached to your body after they bite. Tick bites occur most often during early spring to late summer.   Tick bites are usually harmless, but they can rarely cause more serious health problems. For this reason, it's important to remove the tick as soon as you find it. Many of the diseases that ticks carry can cause flu-like symptoms and a rash. Some people may have an allergic reaction to a tick bite.   What to expect   Because most ticks don't carry diseases, tick bites don't usually cause serious health problems.   When to seek care   Call us at [phone]   with any sudden or unexpected symptoms.   Call 911 or get emergency care if you develop:    Difficulty breathing.    Severe headache.    Paralysis (trouble moving).    Heart palpitations (heart beating fast).    Part of the tick is still attached to your skin.    The rash gets bigger or turns into a bull's-eye pattern.    Flu-like symptoms, such as fever, fatigue, or headache.    Signs of an allergic reaction, such as hives; trouble breathing; swelling  of your throat, mouth, lips, or tongue; or dizziness, weakness, or confusion.    Signs of infection, including redness, warmth, or oozing at the site of the tick bite.   Other treatment    Remove the tick completely, using tweezers if possible. Don't smother the tick or try to burn it. Don't use nail polish, petroleum jelly (Vaseline), liquid soap, or kerosene to remove it.    Once you've removed the tick, put it in a plastic bag and store it in the freezer for later identification.    Clean the site of the bite with rubbing alcohol or soap and water. Wash your hands well with soap and water.    Trim your fingernails and try to avoid scratching. Scratching can lead to a skin infection and scarring.   Prevention   Though ticks are most active from April through September, the following tips will help you avoid ticks and tick bites all year round.   Avoid them:    Stay out of tick-infested areas. When walking in wooded or grassy areas, stay in the center of trails.    Wear protective clothing, including a hat, long sleeves, and long pants tucked into your socks. Wear gloves if you'll be touching animals or working outside clearing brush or handling wood.   Repel them:    Apply insect repellent to exposed skin and clothing when outdoors in areas where ticks may live. The most effective repellents are those that contain 20 to 30% DEET or 0.5% permethrin.    Always follow instructions when applying repellents, and take care to avoid eyes, mouth, and hands.   Find and remove them:    Bathe or shower as soon as you return from time spent outdoors where ticks may be present. This helps you see whether you have any ticks or tick bites on your body. Parents should carefully inspect their children from head to toe, especially in their hair, in and around their ears, inside the belly button, behind the knees, and between the legs.    Check over backpacks, equipment, gear, and/or pets you may have had with you. Ticks can easily  catch a ride on something and attach to a person later.    Throw your clothes into a dryer and tumble dry on high heat for 1 hour to kill any remaining ticks.   Your provider   Your diagnosis was provided by Jen Ott, a member of your trusted care team at [facility]   If you have any questions, call us at [phone]  .

## 2023-03-01 NOTE — E-VISIT TREATED
Chief Complaint: Rashes and other skin conditions   Patient introduction   Patient is 57-year-old female presenting with pruritic, nonpainful rash on their left arm for less than 24 hours.   Before the rash appeared, patient was bitten by a tick.   Patient did not request an excuse note.   General presentation   No fever. No additional systemic symptoms.   Has not tried any treatments for current rash symptoms.   Chickenpox: Has had chickenpox.   Scabies: No recent scabies exposure.   Impetigo: No recent impetigo exposure.   Monkeypox: No recent monkeypox exposure.   In previous month, patient has not spent any time in regions with a high risk of tick-borne disease. Patient was bitten by a tick. Patient Patient successfully removed entire tick. It has been less than 3 days since patient removed tick. Tick was engorged at time of removal. Patient uncertain how long tick was attached to their skin.   Pruritus described as moderate and is worsening. Itching doesn't affect daily activities. Itching does not affect sleep.   Rash has not expanded or spread to a new location.   No history of prior MRSA infection.   Review of red flags/alarm symptoms:    No systemic symptoms    No symptoms of anaphylactic reaction    No swollen lymph nodes    No recent history suggesting adverse drug rash (no new medication, herb, or supplement)    No signs of early disseminated infection to late persistent Lyme disease   Pregnancy/breastfeeding: Patient is postmenopausal.   Current medications   Currently taking diclofenac 75 MG EC tablet, tolterodine 2 MG tablet, pantoprazole 40 MG EC tablet, montelukast 10 MG tablet, chlorhexidine 0.12 % solution, Multivitamin Adults 50+ tablet tablet, metoprolol succinate XL 50 MG 24 hr tablet, and famotidine 20 MG tablet.   Medication allergies    Penicillins   Medication contraindication review   Not currently taking ACE inhibitors or ARBs.   No cerebral malaria, CHF, cutaneous lidit-bfyvps-ztil  disease, folate deficiency, G6PD deficiency (or breastfeeding a child with G6PD deficiency), generalized erythroderma, liver disease, lamellar ichthyosis, malignancy or premalignancy at the affected site, megaloblastic anemia, mononucleosis, Netherton syndrome, peripheral neuropathy, porphyria, QT prolongation, congenital long QT syndrome, skin barrier defect condition, systemic mycoses, TMP/SMX-associated thrombocytopenia, thyroid dysfunction, or ulcerative colitis.   No history of myasthenia gravis, aortic aneurysm or dissection, hypertension, peripheral vascular disease, Marfan syndrome, or Chacorta-Danlos syndrome.   Past medical history   Immune conditions: No immunocompromising conditions. No history of cancer.   Assessment   Tick bites.   This is the likely diagnosis based on supporting interview responses and patient-submitted photos, including:    Known tick bite   Plan   Medications:    doxycycline hyclate 100 mg tablet RX 100mg 2 tab PO once 1d for 1 dose only. Amount is 2 tab.    hydrocortisone 1 % topical cream OTC 1% apply thin film on affected area qid PRN 14d Use for up to 14 days. Discontinue sooner if symptoms clear up completely. Amount is 80 g.   The patient's prescription will be sent to:   [facility] #[id number]   [address] [facility] [id number]   Phone: [phone]     Fax: [phone]   Patient informed to purchase OTC medication.   Education:    Condition and causes    Prevention    Treatment and self-care    When to call provider   Additional Notes   Unable to assess if tick is a deer tick or dog tick. Area is small, erythematous   ----------   Electronically signed by MARTHA Kinney FNP-BC on 2023-02-28 at 20:45PM   ----------   Patient Interview Transcript:   Where is the affected area located? Select all that apply.    Arm   Not selected:    Scalp    Face    Inside mouth or on lips    Neck    Hand    Chest    Stomach    Back    Buttocks    Groin    Leg    Foot  or in between toes    None of the above   Which arm is bothering you? Select one.    Left   Not selected:    Right    Both   Before your skin symptoms appeared, were you exposed to any of these possible triggers? Select all that apply.    Tick bite   Not selected:    Other insect bite or sting in the affected area    Cut, scrape, or other skin injury in the affected area    Contact with poison oak, poison ivy, or poison sumac in the affected area    Contact with a new soap, perfume, skincare product, cleaning product, or other chemical in the affected area    Wearing new jewelry, belt buckle, or other metal accessory in the affected area    Taking a new medication, supplement, or herb    Consuming a new food or drink    Vaccine injection    Exposure to extreme hot or cold temperatures    Exercising intensely    Sitting in a hot tub or swimming in a heated swimming pool    Wearing ill-fitting shoes or socks for a long time    Contact with someone who has a similar rash    Contact with someone who has impetigo    Contact with someone who has scabies    Other (specify)    None of the above   In the last 21 days, have you been exposed to monkeypox? Monkeypox is a highly contagious skin condition caused by infection with the monkeypox virus. Select all that apply.    No, not that I know of   Not selected:    Close contact with someone who has a confirmed or likely diagnosis of monkeypox    Close contact with someone in a community experiencing high rates of monkeypox infection, including men who have sex with men   Have you ever had chickenpox? Select one.    Yes   Not selected:    No    I'm not sure   Since your skin symptoms first appeared, have they spread or shown up in new locations? This includes covering a larger area than they first did, or spreading to another part of the body. Select one.    No   Not selected:    Yes   Which of these describe the affected area? Select all that apply.    Itchy   Not selected:     Painful    Warmer than other areas of my skin    None of the above   How intense is the itching? Select one.    Moderate   Not selected:    Mild    Severe    Unbearable   Has the itching gotten better, worse, or stayed the same? Select one.    Worse   Not selected:    Better    Same   Has the itching made daily activities difficult? Select one.    No   Not selected:    Yes   Has the itching made it difficult to sleep? Select one.    No   Not selected:    Yes   How long have you had these current skin symptoms? Select one.    Less than 24 hours   Not selected:    24 to 48 hours    2 to 3 days    3 to 5 days    5 to 7 days    1 to 2 weeks    More than 2 weeks   Ready to send photos? If you choose not to send photos, you may need to speak with a provider to get care.    OK, I'll send photos   Not selected:    No, I'd rather not send photos   Send at least 3 photos for review - Don't use a flash. - Make sure the photos are in focus. - Take a close-up photo (for size, shape, color). - Take a photo showing surrounding area (to compare with healthy skin). - Take a photo with a quarter coin or ruler near the affected area to show scale. - If more than one location is affected, repeat for each location.    Upload 1    Upload 2   Not selected:    Upload 3    Upload 4    Upload 5   A rash can be a sign of a more serious condition. These conditions may need in-person care for an exam or lab tests. Along with your skin symptoms, have you had any of these symptoms? Select all that apply.    None of these   Not selected:    Fever    Chills    Body aches or muscle aches    Nausea    Vomiting    Diarrhea    Headache    Fatigue, exhaustion, or lethargy (feeling drowsy, dull, or low energy)   Have you had swollen lymph nodes? Swollen lymph nodes are small lumps under the skin that are soft, tender, and often painful. They may be noticed in the neck, the armpits, or the groin. Select one.    No, not that I've noticed   Not selected:     Yes   Along with your skin symptoms, have you had any of these symptoms? Select all that apply.    None of these   Not selected:    Chest pains or rapid heartbeat    Shortness of breath or wheezing    Swelling of lips or tongue    Throat tightness or hoarse voice    Stomach cramps, diarrhea, or vomiting    Confusion or dizziness   In the last month, have you been to any of these states? Scroll to see all options. Select all that apply.    No   Not selected:    [facility]   Were you able to remove the tick from your body? Select one.    Yes, I removed the entire tick   Not selected:    No, all or part of the tick is still attached    There wasn't a tick attached to my skin   How long has it been since you removed the tick? Select one.    Less than 3 days   Not selected:    More than 3 days   Was the tick engorged when you removed it? Engorged ticks are full of blood and appear swollen.    Yes   Not selected:    No    I'm not sure   How long was the tick attached to your skin? Select one.    I'm not sure   Not selected:    Less than 36 hours    More than 36 hours   Do you or does anyone in your family have a history of allergies (hay fever, seasonal allergies), eczema, or asthma? Select all that apply.    No, not that I know of   Not selected:    Yes, I do    Yes, a family member does   Have you ever been treated for a MRSA (methicillin-resistant Staphylococcus aureus) infection? MRSA is a type of bacteria that's resistant to several antibiotics. Select one.    No, not that I know of   Not selected:    Yes   Do you have any of these conditions? Scroll to see all options. Select all that apply.    None of the above   Not selected:    Cerebral malaria    Congenital long QT syndrome    Folate deficiency     Systemic fungal infection, such as invasive candidiasis    G6PD deficiency, or breastfeeding a child with G6PD deficiency    Infection at the affected area    Megaloblastic anemia    Mono (mononucleosis)    Decreased sensation in feet (peripheral neuropathy)    Porphyria    Skin cancer or pre-malignancy at the affected area    A serious skin condition (skin barrier defect condition, Netherton syndrome, lamellar ichthyosis, generalized erythroderma, or cutaneous qibcy-adobtf-mrwf disease)    QT prolongation    Thyroid dysfunction    Ulcerative colitis   Do you have any of these conditions that can affect the immune system? Scroll to see all options. Select all that apply.    None of these   Not selected:    History of bone marrow transplant    Chronic kidney disease    Chronic liver disease (including cirrhosis)    HIV/AIDS    Inflammatory bowel disease (Crohn's disease or ulcerative colitis)    Lupus    Moderate to severe plaque psoriasis    Multiple sclerosis    Rheumatoid arthritis    Sickle cell anemia    Alpha or beta thalassemia    History of solid organ transplant (kidney, liver, or heart)    History of spleen removal    An autoimmune disorder not listed here    A condition requiring treatment with long-term use of oral steroids (such as prednisone, prednisolone, or dexamethasone)   Have you ever been diagnosed with cancer? Select one.    No   Not selected:    Yes, I have cancer now    Yes, but I'm in remission   Do you have any of these conditions? Scroll to see all options. Select all that apply.    None of the above   Not selected:    Myasthenia gravis    History of aortic aneurysm or dissection    Peripheral vascular disease    High blood pressure    Marfan syndrome    Chacorta-Danlos syndrome   Are you currently being treated for type 1 or type 2 diabetes? Select one.    No   Not selected:    Yes   Have you gone through menopause? Select one.    Yes   Not selected:    No    I'm going through it now   Have  "you tried any treatments for your current symptoms? Select one.    No   Not selected:    Yes   Are you taking any of these medications? Select all that apply.    No   Not selected:    An ACE inhibitor, such as lisinopril, enalapril, captopril, or benazepril    An angiotensin II receptor blocker (ARB), such as candesartan, irbesartan, losartan, or valsartan    Kynmobi or Apokyn (apomorphine)   Are you still taking these medications listed in your medical record? If you're not taking any of these, click Next. Select all that apply.    diclofenac 75 MG EC tablet    tolterodine 2 MG tablet    pantoprazole 40 MG EC tablet    montelukast 10 MG tablet    chlorhexidine 0.12 % solution    Multivitamin Adults 50+ tablet tablet    metoprolol succinate XL 50 MG 24 hr tablet    famotidine 20 MG tablet   Are you taking any other medications, vitamins, or supplements? Select one.    No   Not selected:    Yes   Have you ever had an allergic or bad reaction to any medication? Select one.    Yes   Not selected:    No   Have you had an allergic or bad reaction to any of these antibiotic medications? Select all that apply.    Penicillin or any \"-cillin\" antibiotic, such as amoxicillin, ampicillin, dicloxacillin, nafcillin, or piperacillin (Brands include Augmentin, Unasyn, and Zosyn)   Not selected:    Tetracycline or any \"-cycline\" antibiotic, such as doxycycline, demeclocycline, minocycline (Brands include Declomycin, Doryx, Dynacin, Oracea, Monodox, and Vibramycin)    Ciprofloxacin or any \"-floxacin\" antibiotic, such as gemifloxacin, levofloxacin, moxifloxacin, or ofloxacin (Brands include Factive, Cipro, Floxin, and Levaquin)    Cephalexin or any \"cef-\" antibiotic, such as cefazolin, cefdinir, cefuroxime, ceftriaxone, ceftazidime, or cefepime (Brands include Ancef, Ceftin, Fortaz, Keflex, Maxipime, Rocephin, and Simplicef)    Clindamycin or lincomycin (Brands include Cleocin and Lincocin)    Sulfamethoxazole, sulfisoxazole, " sulfasalazine, or dapsone (Brands include Aczone, Bactrim and Septra)    No, not that I know of   Have you had an allergic or bad reaction to any of these medications? Select all that apply.    No, not that I know of   Not selected:    Antifungals, such as clotrimazole, fluconazole, ketoconazole, miconazole, or itraconazole, (Diflucan, Extina, Lotrimin-AF, Nizoral, Micatin, Onmel, Zeasorb)    Antiparasitics, such as Permethrin, or lindane (Elimite, Nix, Kwell)    Antivirals, such as acyclovir, penciclovir, or valacyclovir (Valtrex, Zovirax)    Griseofulvin (Tatiana-PEG)    Terbinafine (Lamisil)   Have you had an allergic or bad reaction to any corticosteroids? - Examples of topical corticosteroids include: hydrocortisone (Cortizone), clobetasol (Temovate, Cormax), betamethasone (Diprolene), fluocinonide (Vanos), desonide (Desowen, Desonate, Tridesilon), triamcinolone (Kenalog, Trianex), alclometasone, and mometasone (Elocon). - Examples of oral corticosteroids include: prednisone and methylprednisolone (Medrol). Select one.    No, not that I know of   Not selected:    Yes   Have you had an allergic or bad reaction to any of these topical medications? A topical medication is a cream, gel, or ointment that's put on the skin. Select all that apply.    No, not that I know of   Not selected:    Mupirocin (Bactroban)    Topical retinoids, such as adapalene, azelaic acid, tazarotene, or tretinoin (Azelex, Differin, Finacea, or Tazorac)    Pimecrolimus or tacrolimus (Elidel or Protopic)    Crisaborole (Eucrisa)   Have you had an allergic or bad reaction to any of these medications? Select all that apply.    No, not that I know of   Not selected:    Acetaminophen (Tylenol)    Ibuprofen (Advil, Motrin, Midol)    Diphenhydramine (Benadryl)    Cetirizine (Zyrtec)    Hydroxyzine pamoate (Vistaril)    Loratadine (Alavert, Claritin)    Fexofenadine (Allegra)    Ammonium lactate lotion (Amlactin, Lac-Hydrin, Laclotion, Ultravate)     Salicylic acid cream (Salacyn, Salex)    Urea cream (Aqua Care, Nutraplus)    Calcium acetate/aluminum sulfate (Domeboro)   Have you had an allergic or bad reaction to ondansetron (Zuplenz, Zofran ODT, Zofran)? Select one.    No, not that I know of   Not selected:    Yes   Do you need a doctor's note? A doctor's note confirms that you received care today and states when you can return to school or work. It does not contain information about your diagnosis or treatment plan. Your provider will make the final decision on whether to give you a doctor's note. Doctor's notes cannot be backdated. Select one.    No   Not selected:    Today only (1 day)    Today and tomorrow (2 days)    3 days   Is there anything else you'd like to tell us about your symptoms?   The patient did not enter any additional information.   ----------   Medical history   Medical history data does not currently exist for this patient.

## 2023-04-12 RX ORDER — METOPROLOL SUCCINATE 50 MG/1
50 TABLET, EXTENDED RELEASE ORAL DAILY
Qty: 90 TABLET | Refills: 0 | Status: SHIPPED | OUTPATIENT
Start: 2023-04-12

## 2023-04-12 NOTE — TELEPHONE ENCOUNTER
Rx Refill Note  Requested Prescriptions     Pending Prescriptions Disp Refills   • metoprolol succinate XL (TOPROL-XL) 50 MG 24 hr tablet 90 tablet 1     Sig: Take 1 tablet by mouth Daily. For blood pressure      Last office visit with prescribing clinician: 7/21/2022   Last telemedicine visit with prescribing clinician: Visit date not found   Next office visit with prescribing clinician: Visit date not found                         Would you like a call back once the refill request has been completed: [] Yes [] No    If the office needs to give you a call back, can they leave a voicemail: [] Yes [] No    Taras Horner Rep  04/12/23, 16:38 EDT

## 2023-09-13 ENCOUNTER — OFFICE VISIT (OUTPATIENT)
Dept: FAMILY MEDICINE CLINIC | Facility: CLINIC | Age: 58
End: 2023-09-13
Payer: COMMERCIAL

## 2023-09-13 VITALS
HEIGHT: 63 IN | BODY MASS INDEX: 34.84 KG/M2 | DIASTOLIC BLOOD PRESSURE: 80 MMHG | SYSTOLIC BLOOD PRESSURE: 140 MMHG | RESPIRATION RATE: 18 BRPM | WEIGHT: 196.6 LBS | OXYGEN SATURATION: 98 % | HEART RATE: 101 BPM | TEMPERATURE: 97.1 F

## 2023-09-13 DIAGNOSIS — J01.00 ACUTE NON-RECURRENT MAXILLARY SINUSITIS: Primary | ICD-10-CM

## 2023-09-13 DIAGNOSIS — I10 ESSENTIAL HYPERTENSION: ICD-10-CM

## 2023-09-13 PROCEDURE — 99213 OFFICE O/P EST LOW 20 MIN: CPT | Performed by: NURSE PRACTITIONER

## 2023-09-13 RX ORDER — LOSARTAN POTASSIUM 25 MG/1
25 TABLET ORAL DAILY
Qty: 90 TABLET | Refills: 1 | Status: SHIPPED | OUTPATIENT
Start: 2023-09-13

## 2023-09-13 RX ORDER — DOXYCYCLINE 100 MG/1
100 CAPSULE ORAL EVERY 12 HOURS SCHEDULED
Qty: 14 CAPSULE | Refills: 0 | Status: SHIPPED | OUTPATIENT
Start: 2023-09-13

## 2023-09-13 NOTE — PROGRESS NOTES
"Chief Complaint  Earache (Left, x 2 weeks, jaw hurts too)    Subjective        Keke Stewart presents to [facility]  History of Present Illness      Keke Stewart is a 58-year-old female who presents today for left ear pain and jaw pain.    The patient reports she is doing well. She states her ear and jaw have been bothering her intermittently. She notes she has noticed her sinuses have been hurting a little bit. She adds she feels like she has a trigeminal nerve issue. She states she is taking Singulair. She notes she does get ear wax buildup. She denies having a sore throat, sneezing, or runny nose. She adds she has pain when she pushes on her sinuses. She states it is more on the left side and she can feel pressure behind her ear. She denies pain when chewing food.    She notes she ran out of her blood pressure medication. She adds she knows when she comes into the office her blood pressure is extra high. She adds she was put on metoprolol by Dr. Epley. She states she has been off the metoprolol for approximately 2 weeks. She notes her blood pressure readings at home have been 138/80 mmHg and 143 systolic.      Objective   Vital Signs:  /80 (BP Location: Right arm, Patient Position: Sitting, Cuff Size: Adult)   Pulse 101   Temp 97.1 °F (36.2 °C) (Temporal)   Resp 18   Ht 160 cm (62.99\")   Wt 89.2 kg (196 lb 9.6 oz)   SpO2 98%   BMI 34.83 kg/m²   Estimated body mass index is 34.83 kg/m² as calculated from the following:    Height as of this encounter: 160 cm (62.99\").    Weight as of this encounter: 89.2 kg (196 lb 9.6 oz).          Physical Exam  Vitals reviewed.   Constitutional:       General: She is not in acute distress.     Appearance: Normal appearance. She is well-developed. She is not diaphoretic.   HENT:      Right Ear: Tympanic membrane and ear canal normal.      Left Ear: Tympanic membrane and ear canal normal.      Nose: Nose normal. Congestion " present.      Right Sinus: Maxillary sinus tenderness present.      Left Sinus: Maxillary sinus tenderness present.      Mouth/Throat:      Mouth: Mucous membranes are moist.      Pharynx: Oropharynx is clear. No oropharyngeal exudate or posterior oropharyngeal erythema.   Eyes:      Conjunctiva/sclera: Conjunctivae normal.      Pupils: Pupils are equal, round, and reactive to light.   Cardiovascular:      Rate and Rhythm: Normal rate and regular rhythm.      Heart sounds: Normal heart sounds. No murmur heard.    No friction rub. No gallop.   Pulmonary:      Effort: Pulmonary effort is normal. No respiratory distress.      Breath sounds: Normal breath sounds. No wheezing or rales.   Abdominal:      General: Bowel sounds are normal. There is no distension.      Palpations: Abdomen is soft.      Tenderness: There is no abdominal tenderness.   Musculoskeletal:      Cervical back: Neck supple.   Skin:     General: Skin is warm and dry.   Neurological:      Mental Status: She is alert and oriented to person, place, and time.      Result Review :                  Assessment and Plan   Diagnoses and all orders for this visit:    1. Acute non-recurrent maxillary sinusitis (Primary)    2. Essential hypertension    Other orders  -     losartan (Cozaar) 25 MG tablet; Take 1 tablet by mouth Daily.  Dispense: 90 tablet; Refill: 1  -     doxycycline (MONODOX) 100 MG capsule; Take 1 capsule by mouth Every 12 (Twelve) Hours.  Dispense: 14 capsule; Refill: 0           1. Acute non-recurrent maxillary sinusitis  - A prescription for doxycycline 100 MG capsule has been sent to the patient's pharmacy.    2. Hypertension  - A prescription for losartan 25 MG tablet has been sent to the patient's pharmacy.    3. Health maintenance  - The patient will obtain a complete metabolic panel.      Follow Up   No follow-ups on file.  Patient was given instructions and counseling regarding her condition or for health maintenance advice. Please see  specific information pulled into the AVS if appropriate.       Transcribed from ambient dictation for MARTHA Domínguez by Shannan Duenas.  09/13/23   15:28 EDT    Patient or patient representative verbalized consent to the visit recording.  I have personally performed the services described in this document as transcribed by the above individual, and it is both accurate and complete. Answers submitted by the patient for this visit:  Primary Reason for Visit (Submitted on 9/13/2023)  What is the primary reason for your visit?: Ear Pain

## 2023-09-27 ENCOUNTER — OFFICE VISIT (OUTPATIENT)
Dept: GASTROENTEROLOGY | Facility: CLINIC | Age: 58
End: 2023-09-27
Payer: COMMERCIAL

## 2023-09-27 VITALS
TEMPERATURE: 97 F | OXYGEN SATURATION: 98 % | HEIGHT: 63 IN | SYSTOLIC BLOOD PRESSURE: 130 MMHG | BODY MASS INDEX: 34.14 KG/M2 | HEART RATE: 97 BPM | WEIGHT: 192.7 LBS | DIASTOLIC BLOOD PRESSURE: 80 MMHG

## 2023-09-27 DIAGNOSIS — K21.9 GASTROESOPHAGEAL REFLUX DISEASE WITHOUT ESOPHAGITIS: ICD-10-CM

## 2023-09-27 DIAGNOSIS — K44.9 HIATAL HERNIA: Primary | ICD-10-CM

## 2023-09-27 PROCEDURE — 99213 OFFICE O/P EST LOW 20 MIN: CPT | Performed by: NURSE PRACTITIONER

## 2023-09-27 RX ORDER — FAMOTIDINE 20 MG/1
20 TABLET, FILM COATED ORAL NIGHTLY PRN
Qty: 90 TABLET | Refills: 3 | Status: SHIPPED | OUTPATIENT
Start: 2023-09-27

## 2023-09-27 RX ORDER — PANTOPRAZOLE SODIUM 40 MG/1
40 TABLET, DELAYED RELEASE ORAL DAILY
Qty: 90 TABLET | Refills: 3 | Status: SHIPPED | OUTPATIENT
Start: 2023-09-27

## 2023-09-27 NOTE — PROGRESS NOTES
"Chief Complaint   Patient presents with    Heartburn         History of Present Illness  Patient is a 58-year-old female who presents today for Follow-up.  She has a history of GERD with hiatal hernia.    Patient presents today for follow-up.  She reports overall symptoms have been fairly well controlled with pantoprazole since last office visit.  Reports she will have breakthrough symptoms around every 3 to 4 months that will last for several weeks with some heartburn and reflux and globus sensation.    She denies any dysphagia, nausea, vomiting, or abdominal pain.  Denies any bowel complaints.     Result Review :       Office Visit with Shanon Dee APRN (07/15/2022)    Office Visit with Rosalia Rowley APRN (12/21/2022)    Cologuard - Stool, Per Rectum (10/21/2021 08:07)     ENDOSCOPY, INT (08/12/2020)     Tissue Pathology Exam (08/12/2020 09:30)     ENDOSCOPY, INT (08/12/2020)     Vital Signs:   /80   Pulse 97   Temp 97 °F (36.1 °C)   Ht 160 cm (62.99\")   Wt 87.4 kg (192 lb 11.2 oz)   SpO2 98%   BMI 34.14 kg/m²     Body mass index is 34.14 kg/m².     Physical Exam  Vitals reviewed.   Constitutional:       General: She is not in acute distress.     Appearance: She is well-developed.   HENT:      Head: Normocephalic and atraumatic.   Pulmonary:      Effort: Pulmonary effort is normal. No respiratory distress.   Abdominal:      General: Abdomen is flat. Bowel sounds are normal. There is no distension.      Palpations: Abdomen is soft.      Tenderness: There is no abdominal tenderness.   Skin:     General: Skin is dry.      Coloration: Skin is not pale.   Neurological:      Mental Status: She is alert and oriented to person, place, and time.   Psychiatric:         Thought Content: Thought content normal.         Assessment and Plan    Diagnoses and all orders for this visit:    1. Hiatal hernia (Primary)    2. Gastroesophageal reflux disease without esophagitis  -     famotidine (PEPCID) 20 MG " tablet; Take 1 tablet by mouth At Night As Needed for Heartburn or Indigestion.  Dispense: 90 tablet; Refill: 3    Other orders  -     pantoprazole (PROTONIX) 40 MG EC tablet; Take 1 tablet by mouth Daily.  Dispense: 90 tablet; Refill: 3         Discussion  Patient presents today for follow-up of GERD.  Symptoms have been fairly well controlled with pantoprazole however she does have occasional breakthrough symptoms.  Recommended adding in famotidine in the evening during periods of breakthrough and reinforced dietary modifications to help with reflux at today's office visit.  If symptoms worsen, could consider updated EGD.  Colon cancer screening up-to-date with negative Cologuard in 2021, will be due for screening in 2024.          Follow Up   Return in about 1 year (around 9/27/2024), or if symptoms worsen or fail to improve.    Patient Instructions   For GERD, continue pantoprazole daily in the morning.  At times where you are experiencing breakthrough symptoms, take famotidine daily in the evening.    For GERD, follow antireflux precautions.  Recommend avoiding eating within 3 to 4 hours of bedtime.  Avoid foods that can trigger symptoms which may include citrus fruits, spicy foods, tomatoes and red sauces, chocolate, coffee/tea, caffeinated or carbonated beverages, alcohol.     Colon cancer screening up-to-date.  Will be due for colon cancer screening October 2024.

## 2023-09-27 NOTE — PATIENT INSTRUCTIONS
For GERD, continue pantoprazole daily in the morning.  At times where you are experiencing breakthrough symptoms, take famotidine daily in the evening.    For GERD, follow antireflux precautions.  Recommend avoiding eating within 3 to 4 hours of bedtime.  Avoid foods that can trigger symptoms which may include citrus fruits, spicy foods, tomatoes and red sauces, chocolate, coffee/tea, caffeinated or carbonated beverages, alcohol.     Colon cancer screening up-to-date.  Will be due for colon cancer screening October 2024.

## 2023-11-15 ENCOUNTER — TELEPHONE (OUTPATIENT)
Dept: FAMILY MEDICINE CLINIC | Facility: CLINIC | Age: 58
End: 2023-11-15

## 2023-11-15 NOTE — TELEPHONE ENCOUNTER
Caller: Keke Stewart    Relationship: Self    Best call back number: [phone]     Who are you requesting to speak with (clinical staff, provider,  specific staff member):     What was the call regarding: WANTS TO KNOW IF SHE CAN TRANSFER FROM [facility] TO [facility] FOR PCP STATES SHE DOESS NOT HAVE A PROBLEM WITH [facility] BUT JUST FEELS MORE COMFORTABLE WITH [facility] AS SHE IS A FEMALE PLEASE CALL AND ADVISE

## 2023-11-16 ENCOUNTER — TELEPHONE (OUTPATIENT)
Dept: FAMILY MEDICINE CLINIC | Facility: CLINIC | Age: 58
End: 2023-11-16
Payer: COMMERCIAL

## 2023-11-16 ENCOUNTER — OFFICE VISIT (OUTPATIENT)
Dept: FAMILY MEDICINE CLINIC | Facility: CLINIC | Age: 58
End: 2023-11-16
Payer: COMMERCIAL

## 2023-11-16 VITALS
OXYGEN SATURATION: 99 % | BODY MASS INDEX: 34.25 KG/M2 | RESPIRATION RATE: 18 BRPM | DIASTOLIC BLOOD PRESSURE: 82 MMHG | HEIGHT: 63 IN | HEART RATE: 108 BPM | TEMPERATURE: 96.8 F | WEIGHT: 193.3 LBS | SYSTOLIC BLOOD PRESSURE: 140 MMHG

## 2023-11-16 DIAGNOSIS — J01.40 ACUTE NON-RECURRENT PANSINUSITIS: Primary | ICD-10-CM

## 2023-11-16 PROCEDURE — 99213 OFFICE O/P EST LOW 20 MIN: CPT | Performed by: NURSE PRACTITIONER

## 2023-11-16 RX ORDER — PREDNISONE 10 MG/1
TABLET ORAL SEE ADMIN INSTRUCTIONS
COMMUNITY
Start: 2023-11-09

## 2023-11-16 RX ORDER — DOXYCYCLINE HYCLATE 100 MG/1
100 CAPSULE ORAL 2 TIMES DAILY
Qty: 20 CAPSULE | Refills: 0 | Status: SHIPPED | OUTPATIENT
Start: 2023-11-16

## 2023-11-16 RX ORDER — ALBUTEROL SULFATE 90 UG/1
2 AEROSOL, METERED RESPIRATORY (INHALATION) EVERY 4 HOURS PRN
Qty: 8 G | Refills: 0 | Status: SHIPPED | OUTPATIENT
Start: 2023-11-16

## 2023-11-16 NOTE — TELEPHONE ENCOUNTER
Patient has no conflict with Serafin. She lost her  and feels her an jessica connected more. She has no problems with seeing Serafin if she has health issues in the future and Jessica was not available.

## 2023-11-16 NOTE — TELEPHONE ENCOUNTER
Patient came in for appointment today. Patient states she has been trying to switch her provider from James Epley to Jessica Brown for several months and has not received any word back. Patient states she will have to find a new provider if this cannot be done for her. Patient states Jessica has told her that she is okay iwht taking her as a patient. Please advise.

## 2023-11-16 NOTE — PROGRESS NOTES
"Chief Complaint  URI (Sinus, pain, pressure, Green yellow. X 3 weeks/Neg Covid test/Was seen in [facility] last week, gave medrol still taking)    Subjective        Keke Stewart presents to [facility]  History of Present Illness    Keke Stewart is a 58-year-old female who presents today for sinus pain and pressure.    The patient reports her symptoms began with a severe cough, then worsened. She notes what prompted her to go to the [facility] and was prescribed a steroid for sinusitis. She adds her symptoms have worsened since Monday, 11/13/2023. She reports she is experiencing pain in her sinuses, cheek bones, and forehead. She states her drainage is green and yellow in color. She adds occasionally, it is difficult to breathe. She denies utilizing an inhaler at home. She states her daughter instructed her to perform breathing exercises. She states she has been taking over-the-counter Robitussin every 2 hours.    She states in 09/2023, she was prescribed doxycycline for her ears.    Objective   Vital Signs:  /82 (BP Location: Right arm, Patient Position: Sitting, Cuff Size: Adult)   Pulse 108   Temp 96.8 °F (36 °C) (Temporal)   Resp 18   Ht 160 cm (62.99\")   Wt 87.7 kg (193 lb 4.8 oz)   SpO2 99%   BMI 34.25 kg/m²   Estimated body mass index is 34.25 kg/m² as calculated from the following:    Height as of this encounter: 160 cm (62.99\").    Weight as of this encounter: 87.7 kg (193 lb 4.8 oz).     A review of systems was performed, and the pertinent positives are noted in the HPI.          Physical Exam  Vitals reviewed.   Constitutional:       General: She is not in acute distress.     Appearance: Normal appearance. She is well-developed. She is not diaphoretic.   HENT:      Right Ear: Tympanic membrane and ear canal normal.      Left Ear: Tympanic membrane and ear canal normal.      Nose:      Right Sinus: Maxillary sinus tenderness and frontal sinus " tenderness present.      Left Sinus: Maxillary sinus tenderness and frontal sinus tenderness present.      Mouth/Throat:      Mouth: Mucous membranes are moist.      Pharynx: No oropharyngeal exudate or posterior oropharyngeal erythema.   Cardiovascular:      Rate and Rhythm: Normal rate and regular rhythm.      Heart sounds: Normal heart sounds. No murmur heard.     No friction rub. No gallop.   Pulmonary:      Effort: Pulmonary effort is normal. No respiratory distress.      Breath sounds: Normal breath sounds. No wheezing or rales.   Musculoskeletal:      Cervical back: Neck supple.   Skin:     General: Skin is warm and dry.   Neurological:      Mental Status: She is alert and oriented to person, place, and time.        Result Review :                   Assessment and Plan   Diagnoses and all orders for this visit:    1. Acute non-recurrent pansinusitis (Primary)    Other orders  -     doxycycline (VIBRAMYCIN) 100 MG capsule; Take 1 capsule by mouth 2 (Two) Times a Day.  Dispense: 20 capsule; Refill: 0  -     albuterol sulfate  (90 Base) MCG/ACT inhaler; Inhale 2 puffs Every 4 (Four) Hours As Needed for Wheezing.  Dispense: 8 g; Refill: 0    1. Acute non-recurrent pansinusitis (Primary)  -A prescription order for doxycycline, and albuterol sulfate  inhaler has been placed and sent to the patient's pharmacy.           Follow Up   No follow-ups on file.  Patient was given instructions and counseling regarding her condition or for health maintenance advice. Please see specific information pulled into the AVS if appropriate.             Transcribed from ambient dictation for MARTHA Domínguez by Carito Schroeder.  11/16/23   09:05 EST    Patient or patient representative verbalized consent to the visit recording.  I have personally performed the services described in this document as transcribed by the above individual, and it is both accurate and complete.

## 2023-12-22 ENCOUNTER — TELEPHONE (OUTPATIENT)
Dept: FAMILY MEDICINE CLINIC | Facility: CLINIC | Age: 58
End: 2023-12-22

## 2023-12-22 ENCOUNTER — TELEMEDICINE (OUTPATIENT)
Dept: FAMILY MEDICINE CLINIC | Facility: TELEHEALTH | Age: 58
End: 2023-12-22
Payer: COMMERCIAL

## 2023-12-22 DIAGNOSIS — U07.1 COVID-19: Primary | ICD-10-CM

## 2023-12-22 NOTE — PROGRESS NOTES
No chief complaint on file.      Video Visit Reason:   Free Text Description: How to treat covid  Subjective   Keke Stewart is a 58 y.o. female.     History of Present Illness  Covid positive with symptom onset on Wednesday with cough and congestion.  Temp has been 98-99 with cough controlled with OTC medications. She is not short of breath or wheezing. She has headache that is manageable. She is anxious, as her   of Covid about 2 years ago.      The following portions of the patient's history were reviewed and updated as appropriate: allergies, current medications, past medical history, and problem list.      Past Medical History:   Diagnosis Date    GERD (gastroesophageal reflux disease)     Headache      Social History     Socioeconomic History    Marital status:    Tobacco Use    Smoking status: Never    Smokeless tobacco: Never   Vaping Use    Vaping Use: Never used   Substance and Sexual Activity    Alcohol use: No    Drug use: Never    Sexual activity: Defer     medication documentation: reviewed and updated with patient and   Current Outpatient Medications:     albuterol sulfate  (90 Base) MCG/ACT inhaler, Inhale 2 puffs Every 4 (Four) Hours As Needed for Wheezing., Disp: 8 g, Rfl: 0    chlorhexidine (PERIDEX) 0.12 % solution, See Admin Instructions., Disp: , Rfl: 1    diclofenac (VOLTAREN) 75 MG EC tablet, Take 1 tablet by mouth As Needed., Disp: , Rfl:     famotidine (PEPCID) 20 MG tablet, Take 1 tablet by mouth At Night As Needed for Heartburn or Indigestion., Disp: 90 tablet, Rfl: 3    Fexofenadine HCl (ALLEGRA ALLERGY PO), Take  by mouth Daily., Disp: , Rfl:     losartan (Cozaar) 25 MG tablet, Take 1 tablet by mouth Daily., Disp: 90 tablet, Rfl: 1    montelukast (SINGULAIR) 10 MG tablet, TAKE 1 TABLET BY MOUTH EVERY NIGHT AT BEDTIME, Disp: 90 tablet, Rfl: 3    Multiple Vitamins-Minerals (MULTIVITAMIN ADULTS 50+) tablet, Take  by mouth Daily., Disp: , Rfl:     pantoprazole  (PROTONIX) 40 MG EC tablet, Take 1 tablet by mouth Daily., Disp: 90 tablet, Rfl: 3    tolterodine (DETROL) 2 MG tablet, Take 1 tablet by mouth As Needed., Disp: , Rfl:     fluticasone (Flonase) 50 MCG/ACT nasal spray, 2 sprays each nostril 2 times a day prn (Patient taking differently: As Needed. 2 sprays each nostril 2 times a day prn), Disp: 4 bottle, Rfl: 5    Nirmatrelvir & Ritonavir, 300mg/100mg, (PAXLOVID) 20 x 150 MG & 10 x 100MG tablet therapy pack tablet, Take 3 tablets by mouth 2 (Two) Times a Day for 5 days., Disp: 30 tablet, Rfl: 0    predniSONE (DELTASONE) 10 MG (21) dose pack, See Admin Instructions. follow package directions, Disp: , Rfl:   Review of Systems   Constitutional:  Positive for activity change, fatigue and fever.   HENT:  Positive for congestion, postnasal drip and sinus pressure.    Respiratory:  Positive for cough. Negative for shortness of breath and wheezing.    Neurological:  Positive for headaches.       Objective   Physical Exam  Constitutional:       General: She is not in acute distress.     Appearance: She is not ill-appearing.   HENT:      Nose: Congestion present.   Pulmonary:      Effort: Pulmonary effort is normal.   Neurological:      Mental Status: She is alert.   Psychiatric:         Mood and Affect: Mood normal.         Speech: Speech normal.         Assessment & Plan   Diagnoses and all orders for this visit:    1. COVID-19 (Primary)  -     Nirmatrelvir & Ritonavir, 300mg/100mg, (PAXLOVID) 20 x 150 MG & 10 x 100MG tablet therapy pack tablet; Take 3 tablets by mouth 2 (Two) Times a Day for 5 days.  Dispense: 30 tablet; Refill: 0         Medication list reviewed with patient due to the risk of serious adverse reactions that can be caused by drug interactions with Paxlovid. EUA, potential side effects, benefits and alternative treatment discussed. Previous labs were reviewed and patient has no previous history of kidney or liver disease. Day 2 of infection. Home isolation  was reviewed with the patient.             Follow Up:  If your symptoms are not resolving by the completion of your treatment or are worsening, see your primary care provider for follow up. If you don't have a primary care provider, you may go to any Urgent Care for re-evaluation. If you develop any life threatening symptoms, go to the nearest Emergency Department immediately or call EMS.               The use of  Video Visit was utilized during this visit, using both [employer] and Silent Edge/Epic. The use of a video visit has been reviewed with the patient and verbal informed consent has been obtained. No technical difficulties occurred during the visit.    is located at [address] [place] [id number]  Provider is located at [place], [place]

## 2023-12-22 NOTE — TELEPHONE ENCOUNTER
Caller: Keke Stewart    Relationship to patient: Self    Best call back number: [phone]    Date of positive COVID19 test: 12/22/23    Date of possible COVID19 exposure:     COVID19 symptoms: COUGH AND HEADACHE    Date of initial quarantine: 12/20/23    Additional information or concerns: PATIENT WOULD LIKE TO KNOW WHAT [facility] ADVISES.    PLEASE CALL.    What is the patients preferred pharmacy: [id number] [id number] [place] [address] AT [facility] [phone] [facility] [phone]

## 2023-12-22 NOTE — PATIENT INSTRUCTIONS
Quarantine and Isolation  Quarantine and isolation refer to local and travel restrictions to protect the public and travelers from contagious diseases that constitute a public health threat. Contagious diseases are diseases that can spread from one person to another. Quarantine and isolation help to protect the public by preventing exposure to people who have or may have a contagious disease.  Isolation separates people who are sick with a contagious disease from people who are not sick.  Quarantine separates and restricts the movement of people who were exposed to a contagious disease to see if they become sick.  You may be put in quarantine or isolation if you have been exposed to or diagnosed with any of the following diseases:  Severe acute respiratory syndromes, such as COVID-19.  Cholera.  Diphtheria.  Tuberculosis.  Plague.  Smallpox.  Yellow fever.  Viral hemorrhagic fevers, such as [place], Ebola, and [place].  When to quarantine or isolate  Follow these rules, whether you have been vaccinated or not:  Stay home and isolate from others when you are sick with a contagious disease.  Isolate when you test positive for a contagious disease, even if you do not have symptoms.  Isolate if you are sick and suspect that you may have a contagious disease.  If you suspect that you have a contagious disease, get tested.  If your test results are negative, you can end your isolation.  If your test results are positive, follow the full isolation recommendations as told by your health care provider or local health authorities.  Quarantine and stay away from others when you have been in close contact with someone who has tested positive for a contagious disease. Close contact is defined as being less than 6 ft (1.8 m) away from an infected person for a total of 15 minutes or more over a 24-hour period.  Do not go to places where you are unable to wear a mask, such as restaurants and some gyms.  Stay home and separate  from others as much as possible.  Avoid being around people who may get very sick from the contagious disease that you have.  Use a separate bathroom, if possible.  Do not travel. For travel guidance, visit the CDC's travel webpage at wwwnc.cdc.gov/travel/  Follow these instructions at home:  Medicines    Take over-the-counter and prescription medicines as told by your health care provider. Finish all antibiotic medicine even when you start to feel better.  Stay up to date with all your vaccines. Get scheduled vaccines and boosters as recommended by your health care provider.  Lifestyle  Wear a high-quality mask if you must be around others at home and in public, if recommended.  Improve air flow (ventilation) at home to help prevent the disease from spreading to other people, if possible.  Do not share personal household items, like cups, towels, and utensils.  Practice everyday hygiene and cleaning.  General instructions  Talk to your health care provider if you have a weakened body defense system (immune system). People with a weakened immune system may have a reduced immune response to vaccines. You may need to follow current prevention measures, including wearing a well-fitting mask, avoiding crowds, and avoiding poorly ventilated indoor places.  Monitor symptoms and follow health care provider instructions, which may include resting, drinking fluids, and taking medicines.  Follow specific isolation and quarantine recommendations if you are in places that can lead to disease outbreaks, such as correctional and retirement facilities, homeless shelters, and cruise ships.  Return to your normal activities as told by your health care provider. Ask your health care provider what activities are safe for you.  Keep all follow-up visits. This is important.  Where to find more information  CDC: www.cdc.gov/quarantine/index.html  Contact a health care provider if:  You have a fever.  You have signs and symptoms that  return or get worse after isolation.  Get help right away if:  You have difficulty breathing.  You have chest pain.  These symptoms may be an emergency. Get help right away. Call 911.  Do not wait to see if the symptoms will go away.  Do not drive yourself to the hospital.  Summary  Isolation and quarantine help protect the public by preventing exposure to people who have or may have a contagious disease.  Isolate when you are sick or when you test positive, even if you do not have symptoms.  Quarantine and stay away from others when you have been in close contact with someone who has tested positive for a contagious disease.  This information is not intended to replace advice given to you by your health care provider. Make sure you discuss any questions you have with your health care provider.  Document Revised: 12/29/2022 Document Reviewed: 12/08/2022  [employer] Patient Education © 2023 [employer]

## 2024-01-02 ENCOUNTER — TELEPHONE (OUTPATIENT)
Dept: FAMILY MEDICINE CLINIC | Facility: CLINIC | Age: 59
End: 2024-01-02

## 2024-01-02 NOTE — TELEPHONE ENCOUNTER
She can test positive up to 30 + days  She no longer needs to quarantine as long as her symptoms are improving. Recommend a mask x 5 days once out of quarantine

## 2024-01-02 NOTE — TELEPHONE ENCOUNTER
Caller: Keke Stewart    Relationship: Self    Best call back number: [phone]     What was the call regarding: PATIENT IS REQUESTING A CALL BACK ASAP. PATIENT STATED THAT SHE HAD COVID OVER THE HOLIDAYS. PATIENT STATED THAT IT HAS BEEN ABOUT A WEEK AND SHE TESTED TODAY TO MAKE SURE AND SHE IS TESTING POSITIVE. PATIENT STATED SHE HAS JUST COUGHED A COUPLE TIMES THIS MORNING, BUT OTHER THAN THAT NO SYMPTOMS. PLEASE ADVISE.

## 2024-02-05 NOTE — TELEPHONE ENCOUNTER
Rx Refill Note  Requested Prescriptions     Pending Prescriptions Disp Refills    montelukast (SINGULAIR) 10 MG tablet [Pharmacy Med Name: MONTELUKAST 10MG TABLETS] 90 tablet 3     Sig: TAKE 1 TABLET BY MOUTH EVERY NIGHT AT BEDTIME      Last office visit with prescribing clinician: 7/21/2022   Last telemedicine visit with prescribing clinician: Visit date not found   Next office visit with prescribing clinician: Visit date not found                         Would you like a call back once the refill request has been completed: [] Yes [] No    If the office needs to give you a call back, can they leave a voicemail: [] Yes [] No    Radha Andrews MA  02/05/24, 09:12 EST

## 2024-02-07 RX ORDER — MONTELUKAST SODIUM 10 MG/1
10 TABLET ORAL
Qty: 90 TABLET | Refills: 1 | Status: SHIPPED | OUTPATIENT
Start: 2024-02-07

## 2024-03-15 RX ORDER — LOSARTAN POTASSIUM 25 MG/1
25 TABLET ORAL DAILY
Qty: 90 TABLET | Refills: 1 | Status: SHIPPED | OUTPATIENT
Start: 2024-03-15

## 2024-03-15 NOTE — TELEPHONE ENCOUNTER
Rx Refill Note  Requested Prescriptions     Pending Prescriptions Disp Refills    losartan (COZAAR) 25 MG tablet [Pharmacy Med Name: LOSARTAN 25MG TABLETS] 90 tablet 1     Sig: TAKE 1 TABLET BY MOUTH DAILY      Last office visit with prescribing clinician: 11/16/2023   Last telemedicine visit with prescribing clinician: Visit date not found   Next office visit with prescribing clinician: Visit date not found       {TIP  Please add Last Relevant Lab Date if appropriate: 07/21/22                 Would you like a call back once the refill request has been completed: [] Yes [] No    If the office needs to give you a call back, can they leave a voicemail: [] Yes [] No    Niru Kemp MA  03/15/24, 09:22 EDT

## 2024-09-11 ENCOUNTER — OFFICE VISIT (OUTPATIENT)
Dept: GASTROENTEROLOGY | Facility: CLINIC | Age: 59
End: 2024-09-11
Payer: COMMERCIAL

## 2024-09-11 VITALS
HEIGHT: 63 IN | TEMPERATURE: 96.3 F | BODY MASS INDEX: 34.76 KG/M2 | SYSTOLIC BLOOD PRESSURE: 125 MMHG | WEIGHT: 196.2 LBS | HEART RATE: 93 BPM | OXYGEN SATURATION: 96 % | DIASTOLIC BLOOD PRESSURE: 80 MMHG

## 2024-09-11 DIAGNOSIS — K44.9 HIATAL HERNIA: ICD-10-CM

## 2024-09-11 DIAGNOSIS — Z12.11 ENCOUNTER FOR COLORECTAL CANCER SCREENING: ICD-10-CM

## 2024-09-11 DIAGNOSIS — K21.9 GASTROESOPHAGEAL REFLUX DISEASE WITHOUT ESOPHAGITIS: Primary | ICD-10-CM

## 2024-09-11 DIAGNOSIS — Z12.12 ENCOUNTER FOR COLORECTAL CANCER SCREENING: ICD-10-CM

## 2024-09-11 RX ORDER — PANTOPRAZOLE SODIUM 40 MG/1
40 TABLET, DELAYED RELEASE ORAL DAILY
Qty: 90 TABLET | Refills: 3 | Status: SHIPPED | OUTPATIENT
Start: 2024-09-11

## 2024-09-11 RX ORDER — FAMOTIDINE 20 MG/1
20 TABLET, FILM COATED ORAL NIGHTLY PRN
Qty: 90 TABLET | Refills: 3 | Status: SHIPPED | OUTPATIENT
Start: 2024-09-11

## 2024-09-11 NOTE — PROGRESS NOTES
"Chief Complaint   Patient presents with    Heartburn         History of Present Illness  Patient is a 59-year-old female who presents today For follow-up.  She has a history of GERD and hiatal hernia.  She is due in October for colon cancer screening.    Patient presents today for follow-up.  She continues on pantoprazole 40 mg daily in the morning and uses famotidine 20 mg daily in the evening as needed.  Overall feels symptoms are controlled.  She will occasionally have breakthrough symptoms if she eats late or eats something that will induce reflux but overall is doing well.  She denies any dysphagia, nausea, or vomiting.  Denies any abdominal pain.  Denies any bowel complaints.     Result Review :       Office Visit with Shanon Dee APRN (07/15/2022)    Cologuard - Stool, Per Rectum (10/21/2021 08:07)     ENDOSCOPY, INT (08/12/2020)     Tissue Pathology Exam (08/12/2020 09:30)     Vital Signs:   /80   Pulse 93   Temp 96.3 °F (35.7 °C)   Ht 160 cm (62.99\")   Wt 89 kg (196 lb 3.2 oz)   SpO2 96%   BMI 34.76 kg/m²     Body mass index is 34.76 kg/m².     Physical Exam  Vitals reviewed.   Constitutional:       General: She is not in acute distress.     Appearance: She is well-developed.   HENT:      Head: Normocephalic and atraumatic.   Pulmonary:      Effort: Pulmonary effort is normal. No respiratory distress.   Abdominal:      General: Abdomen is flat. Bowel sounds are normal. There is no distension.      Palpations: Abdomen is soft.      Tenderness: There is no abdominal tenderness.   Skin:     General: Skin is dry.      Coloration: Skin is not pale.   Neurological:      Mental Status: She is alert and oriented to person, place, and time.   Psychiatric:         Thought Content: Thought content normal.           Assessment and Plan    Diagnoses and all orders for this visit:    1. Gastroesophageal reflux disease without esophagitis (Primary)  -     famotidine (PEPCID) 20 MG tablet; Take 1 tablet " by mouth At Night As Needed for Heartburn or Indigestion.  Dispense: 90 tablet; Refill: 3    2. Hiatal hernia    3. Encounter for colorectal cancer screening  -     Cologuard - Stool, Per Rectum; Future    Other orders  -     pantoprazole (PROTONIX) 40 MG EC tablet; Take 1 tablet by mouth Daily.  Dispense: 90 tablet; Refill: 3         Discussion  Patient presents today for follow-up of GERD with hiatal hernia.  Symptoms are stable and controlled at this time.  Will continue current treatment of pantoprazole in the morning and famotidine daily in the evening.  Reinforced dietary modifications to help with reflux at today's visit.  She will be due for colon cancer screening and would like to proceed with Cologuacyndee, order placed for this to be completed October 2024.  If negative we will plan on next screening at 3-year interval and if positive, discussed would recommend colonoscopy.          Follow Up   Return in about 1 year (around 9/11/2025).    Patient Instructions   For GERD, continue current regimen of pantoprazole in the morning and famotidine in the evening as needed.  Refills sent to pharmacy.    For GERD, follow antireflux precautions.  Recommend avoiding eating within 3 to 4 hours of bedtime.  Avoid foods that can trigger symptoms which may include citrus fruits, spicy foods, tomatoes and red sauces, chocolate, coffee/tea, caffeinated or carbonated beverages, alcohol.     Colon cancer screening due October 2024.  Order placed for Cologuard.

## 2024-09-11 NOTE — PATIENT INSTRUCTIONS
For GERD, continue current regimen of pantoprazole in the morning and famotidine in the evening as needed.  Refills sent to pharmacy.    For GERD, follow antireflux precautions.  Recommend avoiding eating within 3 to 4 hours of bedtime.  Avoid foods that can trigger symptoms which may include citrus fruits, spicy foods, tomatoes and red sauces, chocolate, coffee/tea, caffeinated or carbonated beverages, alcohol.     Colon cancer screening due October 2024.  Order placed for Cologuard.

## 2024-12-26 ENCOUNTER — TELEMEDICINE (OUTPATIENT)
Dept: FAMILY MEDICINE CLINIC | Facility: TELEHEALTH | Age: 59
End: 2024-12-26
Payer: COMMERCIAL

## 2024-12-26 DIAGNOSIS — H66.92 LEFT OTITIS MEDIA, UNSPECIFIED OTITIS MEDIA TYPE: Primary | ICD-10-CM

## 2024-12-26 RX ORDER — PHENAZOPYRIDINE HYDROCHLORIDE 200 MG/1
200 TABLET, FILM COATED ORAL 3 TIMES DAILY PRN
Qty: 6 TABLET | Refills: 0 | Status: SHIPPED | OUTPATIENT
Start: 2024-12-26 | End: 2024-12-26

## 2024-12-26 RX ORDER — CIPROFLOXACIN AND DEXAMETHASONE 3; 1 MG/ML; MG/ML
4 SUSPENSION/ DROPS AURICULAR (OTIC) 2 TIMES DAILY
Qty: 7.5 ML | Refills: 0 | Status: SHIPPED | OUTPATIENT
Start: 2024-12-26 | End: 2025-01-05

## 2024-12-26 RX ORDER — SULFAMETHOXAZOLE AND TRIMETHOPRIM 800; 160 MG/1; MG/1
1 TABLET ORAL 2 TIMES DAILY
Qty: 20 TABLET | Refills: 0 | Status: SHIPPED | OUTPATIENT
Start: 2024-12-26 | End: 2024-12-26

## 2024-12-26 RX ORDER — FLUCONAZOLE 150 MG/1
150 TABLET ORAL ONCE
Qty: 2 TABLET | Refills: 0 | Status: SHIPPED | OUTPATIENT
Start: 2024-12-26 | End: 2024-12-26

## 2024-12-27 NOTE — PROGRESS NOTES
Pulse ox request sent to home monitoring team and pt scheduled for telephone visit on 7/30/2020, instructions reviewed with pt. Pt states she just recently moved and does not have Internet access hence can't do video visit at this time. [address] notified. You have chosen to receive care through a telehealth visit.  Do you consent to use a video/audio connection for your medical care today? Yes     CHIEF COMPLAINT  Chief Complaint   Patient presents with    Earache     left         HPI  Keke Stewart is a 59 y.o. female  presents with complaint of left ear pain x 2 weeks.    Review of Systems   HENT:  Positive for ear pain (left).    All other systems reviewed and are negative.      Past Medical History:   Diagnosis Date    GERD (gastroesophageal reflux disease)     Headache        Family History   Problem Relation Age of Onset    Cancer Mother     Cancer Maternal Aunt     Colon cancer Neg Hx     Colon polyps Neg Hx     Inflammatory bowel disease Neg Hx     Irritable bowel syndrome Neg Hx     Ulcerative colitis Neg Hx     Stomach cancer Neg Hx     Rectal cancer Neg Hx        Social History     Socioeconomic History    Marital status:    Tobacco Use    Smoking status: Never    Smokeless tobacco: Never   Vaping Use    Vaping status: Never Used   Substance and Sexual Activity    Alcohol use: No    Drug use: Never    Sexual activity: Defer       Keke Stewart  reports that she has never smoked. She has never used smokeless tobacco.           LMP  (LMP Unknown)     PHYSICAL EXAM  Physical Exam   Constitutional: She appears well-developed and well-nourished.   HENT:   Head: Normocephalic.   Eyes: Pupils are equal, round, and reactive to light.   Pulmonary/Chest: Effort normal.   Musculoskeletal: Normal range of motion.   Neurological: She is alert.   Psychiatric: She has a normal mood and affect.       Results for orders placed or performed in visit on 07/21/22   Comprehensive Metabolic Panel    Collection Time: 07/21/22 11:08 AM    Specimen: Blood   Result Value Ref Range    Glucose 98 65 - 99 mg/dL    BUN 10 6 - 24 mg/dL    Creatinine 0.91 0.57 - 1.00 mg/dL    EGFR Result 73 >59 mL/min/1.73    BUN/Creatinine Ratio 11 9 - 23    Sodium 143 134 - 144 mmol/L     Potassium 4.2 3.5 - 5.2 mmol/L    Chloride 103 96 - 106 mmol/L    Total CO2 26 20 - 29 mmol/L    Calcium 10.1 8.7 - 10.2 mg/dL    Total Protein 7.3 6.0 - 8.5 g/dL    Albumin 4.7 3.8 - 4.9 g/dL    Globulin 2.6 1.5 - 4.5 g/dL    A/G Ratio 1.8 1.2 - 2.2    Total Bilirubin 0.4 0.0 - 1.2 mg/dL    Alkaline Phosphatase 130 (H) 44 - 121 IU/L    AST (SGOT) 19 0 - 40 IU/L    ALT (SGPT) 20 0 - 32 IU/L   Lipid Panel With LDL / HDL Ratio    Collection Time: 07/21/22 11:08 AM    Specimen: Blood   Result Value Ref Range    Total Cholesterol 178 100 - 199 mg/dL    Triglycerides 106 0 - 149 mg/dL    HDL Cholesterol 53 >39 mg/dL    VLDL Cholesterol Dylan 19 5 - 40 mg/dL    LDL Chol Calc (NIH) 106 (H) 0 - 99 mg/dL    LDL/HDL RATIO 2.0 0.0 - 3.2 ratio   TSH Rfx On Abnormal To Free T4    Collection Time: 07/21/22 11:08 AM    Specimen: Blood   Result Value Ref Range    TSH 1.520 0.450 - 4.500 uIU/mL   Urinalysis With Microscopic If Indicated (No Culture) - Urine, Clean Catch    Collection Time: 07/21/22 11:08 AM    Specimen: Urine, Clean Catch   Result Value Ref Range    Specific Gravity, UA 1.008 1.005 - 1.030    pH, UA 7.0 5.0 - 7.5    Color, UA Yellow Yellow    Appearance, UA Clear Clear    Leukocytes, UA 2+ (A) Negative    Protein Negative Negative/Trace    Glucose, UA Negative Negative    Ketones Negative Negative    Blood, UA Negative Negative    Bilirubin, UA Negative Negative    Urobilinogen, UA 0.2 0.2 - 1.0 mg/dL    Nitrite, UA Negative Negative    Microscopic Examination See below:    Hemoglobin A1c    Collection Time: 07/21/22 11:08 AM    Specimen: Blood   Result Value Ref Range    Hemoglobin A1C 5.8 (H) 4.8 - 5.6 %   Microscopic Examination -    Collection Time: 07/21/22 11:08 AM   Result Value Ref Range    WBC, UA 0-5 0 - 5 /hpf    RBC, UA None seen 0 - 2 /hpf    Epithelial Cells (non renal) 0-10 0 - 10 /hpf    Casts None seen None seen /lpf    Bacteria, UA None seen None seen/Few   CBC & Differential    Collection Time:  07/21/22 11:08 AM    Specimen: Blood   Result Value Ref Range    WBC 6.4 3.4 - 10.8 x10E3/uL    RBC 4.69 3.77 - 5.28 x10E6/uL    Hemoglobin 13.8 11.1 - 15.9 g/dL    Hematocrit 41.8 34.0 - 46.6 %    MCV 89 79 - 97 fL    MCH 29.4 26.6 - 33.0 pg    MCHC 33.0 31.5 - 35.7 g/dL    RDW 12.6 11.7 - 15.4 %    Platelets 247 150 - 450 x10E3/uL    Neutrophil Rel % 65 Not Estab. %    Lymphocyte Rel % 26 Not Estab. %    Monocyte Rel % 7 Not Estab. %    Eosinophil Rel % 1 Not Estab. %    Basophil Rel % 1 Not Estab. %    Neutrophils Absolute 4.1 1.4 - 7.0 x10E3/uL    Lymphocytes Absolute 1.7 0.7 - 3.1 x10E3/uL    Monocytes Absolute 0.5 0.1 - 0.9 x10E3/uL    Eosinophils Absolute 0.0 0.0 - 0.4 x10E3/uL    Basophils Absolute 0.1 0.0 - 0.2 x10E3/uL    Immature Granulocyte Rel % 0 Not Estab. %    Immature Grans Absolute 0.0 0.0 - 0.1 x10E3/uL       Diagnoses and all orders for this visit:    1. Left otitis media, unspecified otitis media type (Primary)  -     ciprofloxacin-dexAMETHasone (Ciprodex) 0.3-0.1 % otic suspension; Administer 4 drops into the left ear 2 (Two) Times a Day for 10 days.  Dispense: 7.5 mL; Refill: 0    Other orders  -     Discontinue: sulfamethoxazole-trimethoprim (Bactrim DS) 800-160 MG per tablet; Take 1 tablet by mouth 2 (Two) Times a Day for 10 days.  Dispense: 20 tablet; Refill: 0  -     Discontinue: phenazopyridine (Pyridium) 200 MG tablet; Take 1 tablet by mouth 3 (Three) Times a Day As Needed for Bladder Spasms for up to 2 days.  Dispense: 6 tablet; Refill: 0  -     Discontinue: fluconazole (Diflucan) 150 MG tablet; Take 1 tablet by mouth 1 (One) Time for 1 dose. Take 1 tablet by mouth on Day 1 and 1 tablet by mouth on Day 3  Dispense: 2 tablet; Refill: 0  -     Discontinue: sulfamethoxazole-trimethoprim (Bactrim DS) 800-160 MG per tablet; Take 1 tablet by mouth 2 (Two) Times a Day for 10 days.  Dispense: 20 tablet; Refill: 0  -     Discontinue: phenazopyridine (Pyridium) 200 MG tablet; Take 1 tablet by  mouth 3 (Three) Times a Day As Needed for Bladder Spasms for up to 2 days.  Dispense: 6 tablet; Refill: 0  -     Discontinue: fluconazole (Diflucan) 150 MG tablet; Take 1 tablet by mouth 1 (One) Time for 1 dose. Take 1 tablet by mouth on Day 1 and 1 tablet by mouth on Day 3  Dispense: 2 tablet; Refill: 0          FOLLOW-UP  As discussed during visit with PCP/[facility] if no improvement or Urgent Care/Emergency Department if worsening of symptoms    Patient verbalizes understanding of medication dosage, comfort measures, instructions for treatment and follow-up.    Jen MONIQUE Ott, APRN  12/26/2024  21:11 EST    Mode of Visit: Video  Location of patient: -HOME-  Location of provider: +HOME+  You have chosen to receive care through a telehealth visit.  The patient has signed the video visit consent form.  The visit included audio and video interaction. No technical issues occurred during this visit.      Note Disclaimer: At [facility], we believe that sharing information builds trust and better   relationships. You are receiving this note because you recently visited [facility]. It is possible you   will see health information before a provider has talked with you about it. This kind of information can   be easy to misunderstand. To help you fully understand what it means for your health, we urge you to   discuss this note with your provider.

## 2025-01-01 DIAGNOSIS — H66.92 LEFT OTITIS MEDIA, UNSPECIFIED OTITIS MEDIA TYPE: ICD-10-CM

## 2025-01-01 RX ORDER — CIPROFLOXACIN AND DEXAMETHASONE 3; 1 MG/ML; MG/ML
4 SUSPENSION/ DROPS AURICULAR (OTIC) 2 TIMES DAILY
Qty: 7.5 ML | Refills: 0 | Status: SHIPPED | OUTPATIENT
Start: 2025-01-01 | End: 2025-01-04

## 2025-01-29 ENCOUNTER — TELEMEDICINE (OUTPATIENT)
Dept: FAMILY MEDICINE CLINIC | Facility: TELEHEALTH | Age: 60
End: 2025-01-29
Payer: COMMERCIAL

## 2025-01-29 DIAGNOSIS — U07.1 COVID-19: Primary | ICD-10-CM

## 2025-01-29 RX ORDER — BENZONATATE 100 MG/1
CAPSULE ORAL
Qty: 30 CAPSULE | Refills: 0 | Status: SHIPPED | OUTPATIENT
Start: 2025-01-29 | End: 2025-01-30 | Stop reason: SDUPTHER

## 2025-01-30 ENCOUNTER — TELEPHONE (OUTPATIENT)
Dept: FAMILY MEDICINE CLINIC | Facility: CLINIC | Age: 60
End: 2025-01-30

## 2025-01-30 PROBLEM — M20.12 HALLUX ABDUCTO VALGUS, LEFT: Status: ACTIVE | Noted: 2024-06-07

## 2025-01-30 PROBLEM — G89.29 CHRONIC FOOT PAIN, LEFT: Status: ACTIVE | Noted: 2024-06-07

## 2025-01-30 PROBLEM — M79.672 CHRONIC FOOT PAIN, LEFT: Status: ACTIVE | Noted: 2024-06-07

## 2025-01-30 PROBLEM — M20.42 HAMMER TOE OF LEFT FOOT: Status: ACTIVE | Noted: 2024-06-07

## 2025-01-30 RX ORDER — BENZONATATE 100 MG/1
CAPSULE ORAL
Qty: 30 CAPSULE | Refills: 0 | Status: SHIPPED | OUTPATIENT
Start: 2025-01-30

## 2025-01-30 NOTE — PATIENT INSTRUCTIONS
Cough, Adult  Coughing is a reflex that clears your throat and airways (respiratory system). It helps heal and protect your lungs. It is normal to cough from time to time. A cough that happens with other symptoms or that lasts a long time may be a sign of a condition that needs treatment. A short-term (acute) cough may only last 2-3 weeks. A long-term (chronic) cough may last 8 or more weeks.  Coughing is often caused by:  Diseases, such as:  An infection of the respiratory system.  Asthma or other heart or lung diseases.  Gastroesophageal reflux. This is when acid comes back up from the stomach.  Breathing in things that irritate your lungs.  Allergies.  Postnasal drip. This is when mucus runs down the back of your throat.  Smoking.  Some medicines.  Follow these instructions at home:  Medicines  Take over-the-counter and prescription medicines only as told by your health care provider.  Talk with your provider before you take cough medicine (cough suppressants).  Eating and drinking  Do not drink alcohol.  Avoid caffeine.  Drink enough fluid to keep your pee (urine) pale yellow.  Lifestyle  Avoid cigarette smoke.  Do not use any products that contain nicotine or tobacco. These products include cigarettes, chewing tobacco, and vaping devices, such as e-cigarettes. If you need help quitting, ask your provider.  Avoid things that make you cough. These may include perfumes, candles, cleaning products, or campfire smoke.  General instructions    Watch for any changes to your cough. Tell your provider about them.  Always cover your mouth when you cough.  If the air is dry in your bedroom or home, use a cool mist vaporizer or humidifier.  If your cough is worse at night, try to sleep in a semi-upright position.  Rest as needed.  Contact a health care provider if:  You have new symptoms, or your symptoms get worse.  You cough up pus.  You have a fever that does not go away or a cough that does not get better after 2-3  weeks.  You cannot control your cough with medicine, and you are losing sleep.  You have pain that gets worse or is not helped with medicine.  You lose weight for no clear reason.  You have night sweats.  Get help right away if:  You cough up blood.  You have trouble breathing.  Your heart is beating very fast.  These symptoms may be an emergency. Get help right away. Call 911.  Do not wait to see if the symptoms will go away.  Do not drive yourself to the hospital.  This information is not intended to replace advice given to you by your health care provider. Make sure you discuss any questions you have with your health care provider.  Document Revised: 08/18/2023 Document Reviewed: 08/18/2023  [employer] Patient Education © 2024 [employer]How to Quarantine at Home  Information for Patients and Families    These instructions are for people with confirmed or suspected COVID-19 who do not need to be hospitalized and those with confirmed COVID-19 who were hospitalized and discharged to care for themselves at home.    If you were tested through the Health Department  The Health Department will monitor your wellbeing.  If it is determined that you do not need to be hospitalized and can be isolated at home, you will be monitored by staff from your local or state health department.     If you were tested through a Commercial Lab  You will need to monitor yourself and report changes in your symptoms to your doctor.  See the section below called Monitor Your Symptoms.    Follow these steps until a healthcare provider or local or state health department says you can return to your normal activities.    Stay home except to get medical care  Restrict activities outside your home, except for getting medical care.   Do not go to work, school, or public areas.   Avoid using public transportation, ride-sharing, or taxis.    Separate yourself from other people and animals in your home  People  As much as possible, you should stay  in a specific room and away from other people in your home. Also, you should use a separate bathroom, if available.    Animals  You should restrict contact with pets and other animals while you are sick with COVID-19, just like you would around other people. When possible, have another member of your household care for your animals while you are sick. If you are sick with COVID-19, avoid contact with your pet, including petting, snuggling, being kissed or licked, and sharing food. If you must care for your pet or be around animals while you are sick, wash your hands before and after you interact with pets and wear a facemask. See COVID-19 and Animals for more information.    Call ahead before visiting your doctor  If you have a medical appointment, call the healthcare provider and tell them that you have or may have COVID-19. This information will help the healthcare provider’s office take steps to keep other people from getting infected or exposed.    Wear a facemask  You should wear a facemask when you are around other people (e.g., sharing a room or vehicle) or pets and before you enter a healthcare provider’s office.     If you are not able to wear a facemask (for example, because it causes trouble breathing), then people who live with you should not stay in the same room with you, or they should wear a facemask if they enter your room.    Cover your coughs and sneezes  Cover your mouth and nose with a tissue when you cough or sneeze.   Throw used tissues in a lined trash can.   Immediately wash your hands with soap and water for at least 20 seconds or, if soap and water are not available, clean your hands with an alcohol-based hand  that contains at least 60% alcohol.    Clean your hands often  Wash your hands often with soap and water for at least 20 seconds, especially after blowing your nose, coughing, or sneezing; going to the bathroom; and before eating or preparing food.     If soap and water are  not readily available, use an alcohol-based hand  with at least 60% alcohol, covering all surfaces of your hands and rubbing them together until they feel dry.    Soap and water are the best option if hands are visibly dirty. Avoid touching your eyes, nose, and mouth with unwashed hands.    Avoid sharing personal household items  You should not share dishes, drinking glasses, cups, eating utensils, towels, or bedding with other people or pets in your home.   After using these items, they should be washed thoroughly with soap and water.    Clean all “high-touch” surfaces everyday  High touch surfaces include counters, tabletops, doorknobs, bathroom fixtures, toilets, phones, keyboards, tablets, and bedside tables.   Also, clean any surfaces that may have blood, stool, or body fluids on them.   Use a household cleaning spray or wipe, according to the label instructions. Labels contain instructions for safe and effective use of the cleaning product, including precautions you should take when applying the product, such as wearing gloves and making sure you have good ventilation during use of the product.    Monitor your symptoms  Seek prompt medical attention if your illness is worsening (e.g., difficulty breathing).   Before seeking care, call your healthcare provider and tell them that you have, or are being evaluated for, COVID-19.   Put on a facemask before you enter the facility.     These steps will help the healthcare provider’s office to keep other people in the office or waiting room from getting infected or exposed.   Persons who are placed under active monitoring or facilitated self-monitoring should follow instructions provided by their local health department or occupational health professionals, as appropriate.  If you have a medical emergency and need to call 911, notify the dispatch personnel that you have, or are being evaluated for COVID-19. If possible, put on a facemask before emergency  medical services arrive.    Discontinuing home isolation  Patients with confirmed COVID-19 should remain under home isolation precautions until the risk of secondary transmission to others is thought to be low. The decision to discontinue home isolation precautions should be made on a case-by-case basis, in consultation with healthcare providers and state and local health departments.    The below content are for household members, intimate partners, and caregivers of a patient with symptomatic laboratory-confirmed COVID-19 or a patient under investigation:    Household members, intimate partners, and caregivers may have close contact with a person with symptomatic, laboratory-confirmed COVID-19 or a person under investigation.     Close contacts should monitor their health; they should call their healthcare provider right away if they develop symptoms suggestive of COVID-19 (e.g., fever, cough, shortness of breath)     Close contacts should also follow these recommendations:  Make sure that you understand and can help the patient follow their healthcare provider’s instructions for medication(s) and care. You should help the patient with basic needs in the home and provide support for getting groceries, prescriptions, and other personal needs.  Monitor the patient’s symptoms. If the patient is getting sicker, call his or her healthcare provider and tell them that the patient has laboratory-confirmed COVID-19. This will help the healthcare provider’s office take steps to keep other people in the office or waiting room from getting infected. Ask the healthcare provider to call the local or state health department for additional guidance. If the patient has a medical emergency and you need to call 911, notify the dispatch personnel that the patient has, or is being evaluated for COVID-19.  Household members should stay in another room or be  from the patient as much as possible. Household members should use a  separate bedroom and bathroom, if available.  Prohibit visitors who do not have an essential need to be in the home.  Household members should care for any pets in the home. Do not handle pets or other animals while sick.  For more information, see COVID-19 and Animals.  Make sure that shared spaces in the home have good air flow, such as by an air conditioner or an opened window, weather permitting.  Perform hand hygiene frequently. Wash your hands often with soap and water for at least 20 seconds or use an alcohol-based hand  that contains 60 to 95% alcohol, covering all surfaces of your hands and rubbing them together until they feel dry. Soap and water should be used preferentially if hands are visibly dirty.  Avoid touching your eyes, nose, and mouth with unwashed hands.  The patient should wear a facemask when you are around other people. If the patient is not able to wear a facemask (for example, because it causes trouble breathing), you, as the caregiver, should wear a mask when you are in the same room as the patient.  Wear a disposable facemask and gloves when you touch or have contact with the patient’s blood, stool, or body fluids, such as saliva, sputum, nasal mucus, vomit, or urine.   Throw out disposable facemasks and gloves after using them. Do not reuse.  When removing personal protective equipment, first remove and dispose of gloves. Then, immediately clean your hands with soap and water or alcohol-based hand . Next, remove and dispose of facemask, and immediately clean your hands again with soap and water or alcohol-based hand .  Avoid sharing household items with the patient. You should not share dishes, drinking glasses, cups, eating utensils, towels, bedding, or other items. After the patient uses these items, you should wash them thoroughly (see below “Wash laundry thoroughly”).  Clean all “high-touch” surfaces, such as counters, tabletops, doorknobs, bathroom  fixtures, toilets, phones, keyboards, tablets, and bedside tables, every day. Also, clean any surfaces that may have blood, stool, or body fluids on them.   Use a household cleaning spray or wipe, according to the label instructions. Labels contain instructions for safe and effective use of the cleaning product including precautions you should take when applying the product, such as wearing gloves and making sure you have good ventilation during use of the product.  Wash laundry thoroughly.   Immediately remove and wash clothes or bedding that have blood, stool, or body fluids on them.  Wear disposable gloves while handling soiled items and keep soiled items away from your body. Clean your hands (with soap and water or an alcohol-based hand ) immediately after removing your gloves.  Read and follow directions on labels of laundry or clothing items and detergent. In general, using a normal laundry detergent according to washing machine instructions and dry thoroughly using the warmest temperatures recommended on the clothing label.  Place all used disposable gloves, facemasks, and other contaminated items in a lined container before disposing of them with other household waste. Clean your hands (with soap and water or an alcohol-based hand ) immediately after handling these items. Soap and water should be used preferentially if hands are visibly dirty.  Discuss any additional questions with your state or local health department or healthcare provider.    Adapted from information provided by the Centers for Disease Control and Prevention.  For more information, visit https://www.cdc.gov/coronavirus/2019-ncov/hcp/guidance-prevent-spread.html

## 2025-01-30 NOTE — PROGRESS NOTES
You have chosen to receive care through a telehealth visit.  Do you consent to use a video/audio connection for your medical care today? Yes     CHIEF COMPLAINT  Chief Complaint   Patient presents with    covid         HPI  Keke Stewart is a 59 y.o. female  presents with complaint of COVID. Reports runny nose. No fever or chills. No nausea or vomiting. No CP or SOA. Reports her symptoms started 1 day ago. Reports she has not taken any medications for her symptoms.     Review of Systems   Constitutional:  Negative for chills, fatigue and fever.   HENT:  Positive for congestion and rhinorrhea. Negative for ear discharge, ear pain, sinus pressure, sinus pain and sore throat.    Respiratory:  Positive for cough. Negative for chest tightness, shortness of breath and wheezing.    Cardiovascular:  Negative for chest pain.   Gastrointestinal:  Negative for abdominal pain, diarrhea, nausea and vomiting.   Musculoskeletal:  Negative for back pain and myalgias.   Neurological:  Negative for dizziness and headaches.   Psychiatric/Behavioral: Negative.         Past Medical History:   Diagnosis Date    GERD (gastroesophageal reflux disease)     Headache        Family History   Problem Relation Age of Onset    Cancer Mother     Cancer Maternal Aunt     Colon cancer Neg Hx     Colon polyps Neg Hx     Inflammatory bowel disease Neg Hx     Irritable bowel syndrome Neg Hx     Ulcerative colitis Neg Hx     Stomach cancer Neg Hx     Rectal cancer Neg Hx        Social History     Socioeconomic History    Marital status:    Tobacco Use    Smoking status: Never    Smokeless tobacco: Never   Vaping Use    Vaping status: Never Used   Substance and Sexual Activity    Alcohol use: No    Drug use: Never    Sexual activity: Defer       Keke Stewart  reports that she has never smoked. She has never used smokeless tobacco. I have educated her on the risk of diseases from using tobacco products such as cancer, COPD, and heart  disease.       I spent  1  minutes counseling the patient.              LMP  (LMP Unknown)     PHYSICAL EXAM  Physical Exam   Constitutional: She is oriented to person, place, and time. She appears well-developed and well-nourished. She does not have a sickly appearance. No distress.   HENT:   Head: Normocephalic and atraumatic.   Right Ear: Hearing normal.   Left Ear: Hearing normal.   Nose: Congestion present.   Mouth/Throat: Mouth/Lips are normal.Oropharynx is clear and moist.   Eyes: Conjunctivae and lids are normal.   Pulmonary/Chest: Effort normal.  No respiratory distress.  Neurological: She is alert and oriented to person, place, and time.   Psychiatric: She has a normal mood and affect. Her speech is normal and behavior is normal.           Diagnoses and all orders for this visit:    1. COVID-19 (Primary)    Other orders  -     benzonatate (Tessalon Perles) 100 MG capsule; Take 1 or 2 perles tid prn cough (Patient not taking: Reported on 1/30/2025)  Dispense: 30 capsule; Refill: 0  -     Molnupiravir (LAGEVRIO) 200 MG capsule; Take 4 capsules by mouth Every 12 (Twelve) Hours for 5 days.  Dispense: 40 capsule; Refill: 0    Rest  Fluids  PCP if symptoms persist  Discussed Molnupiravir. Patient hasn't had labs since 2022 unable to prescribe Paxlovid. sent education to patient   ER for any worsening symptoms such as high fever, chest pain or SOA       FOLLOW-UP  As discussed during visit with PCP/[facility] if no improvement or Urgent Care/Emergency Department if worsening of symptoms    Patient verbalizes understanding of medication dosage, comfort measures, instructions for treatment and follow-up.    Nalini Floyd, MARTHA  01/29/2025  20:02 EST    Mode of Visit: Video  Location of patient: -HOME-  Location of provider: +HOME+  You have chosen to receive care through a telehealth visit.  The patient has signed the video visit consent form.  The visit included audio and video interaction. No technical  issues occurred during this visit.      The use of a video visit has been reviewed with the patient and verbal informed consent has been obtained. Myself and Keke Stewart participated in this visit. The patient is located in [address] [place] [address].   I am located in [place], [place]. [employer] and [employer] were utilized. I spent 5 minutes in the patient's chart for this visit.         Note Disclaimer: At [facility], we believe that sharing information builds trust and better   relationships. You are receiving this note because you recently visited [facility]. It is possible you   will see health information before a provider has talked with you about it. This kind of information can   be easy to misunderstand. To help you fully understand what it means for your health, we urge you to   discuss this note with your provider.

## 2025-01-30 NOTE — TELEPHONE ENCOUNTER
Caller: Keke Stewart    Relationship to patient: Self    Best call back number:     [phone]      Date of positive COVID19 test: 1/29/2025      COVID19 symptoms:     CONGESTION, COUGH      Additional information or concerns:     PATIENT IS WANTING TO KNOW IF YOU CAN SEND IN PAXLOVID FOR HER COVID    PATIENT HAD A TELEHEALTH APPOINTMENT THEY SENT HER IN A MEDICATION BUT THE PHARMACY DOES NOT HAVE IT IN STOCK AND DOES NOT KNOW IF OR WHEN THEY CAN GET IT. LAGEVRIO IS THE NAME OF THE MEDICATION THAT SHE CAN NOT GET.  THEREFORE SHE IS REQUESTING PAXLOVID.    PLEASE ADVISE    What is the patients preferred pharmacy:     [id number] - [place], [place] - [address] [place] [address] - [phone]  - [phone]

## 2025-08-27 ENCOUNTER — OFFICE VISIT (OUTPATIENT)
Dept: GASTROENTEROLOGY | Facility: CLINIC | Age: 60
End: 2025-08-27
Payer: COMMERCIAL

## 2025-08-27 VITALS
DIASTOLIC BLOOD PRESSURE: 106 MMHG | HEIGHT: 63 IN | BODY MASS INDEX: 35.93 KG/M2 | WEIGHT: 202.8 LBS | TEMPERATURE: 97.3 F | HEART RATE: 86 BPM | SYSTOLIC BLOOD PRESSURE: 174 MMHG | OXYGEN SATURATION: 99 %

## 2025-08-27 DIAGNOSIS — Z12.12 ENCOUNTER FOR COLORECTAL CANCER SCREENING: ICD-10-CM

## 2025-08-27 DIAGNOSIS — Z12.11 ENCOUNTER FOR COLORECTAL CANCER SCREENING: ICD-10-CM

## 2025-08-27 DIAGNOSIS — K21.9 GASTROESOPHAGEAL REFLUX DISEASE WITHOUT ESOPHAGITIS: Primary | ICD-10-CM

## 2025-08-27 PROCEDURE — 99214 OFFICE O/P EST MOD 30 MIN: CPT | Performed by: NURSE PRACTITIONER

## 2025-08-27 RX ORDER — FAMOTIDINE 20 MG/1
20 TABLET, FILM COATED ORAL NIGHTLY PRN
Qty: 90 TABLET | Refills: 3 | Status: SHIPPED | OUTPATIENT
Start: 2025-08-27

## 2025-08-27 RX ORDER — ESOMEPRAZOLE MAGNESIUM 40 MG/1
40 CAPSULE, DELAYED RELEASE ORAL
Qty: 90 CAPSULE | Refills: 3 | Status: SHIPPED | OUTPATIENT
Start: 2025-08-27

## 2025-08-27 RX ORDER — CLINDAMYCIN HYDROCHLORIDE 300 MG/1
CAPSULE ORAL
COMMUNITY
Start: 2025-07-21 | End: 2025-08-27